# Patient Record
Sex: MALE | HISPANIC OR LATINO | Employment: FULL TIME | URBAN - METROPOLITAN AREA
[De-identification: names, ages, dates, MRNs, and addresses within clinical notes are randomized per-mention and may not be internally consistent; named-entity substitution may affect disease eponyms.]

---

## 2018-04-23 DIAGNOSIS — Z01.812 PRE-OPERATIVE LABORATORY EXAMINATION: ICD-10-CM

## 2018-04-23 LAB
ALBUMIN SERPL BCP-MCNC: 4.5 G/DL (ref 3.2–4.9)
ALBUMIN/GLOB SERPL: 1.5 G/DL
ALP SERPL-CCNC: 76 U/L (ref 30–99)
ALT SERPL-CCNC: 57 U/L (ref 2–50)
ANION GAP SERPL CALC-SCNC: 10 MMOL/L (ref 0–11.9)
AST SERPL-CCNC: 35 U/L (ref 12–45)
BASOPHILS # BLD AUTO: 0.3 % (ref 0–1.8)
BASOPHILS # BLD: 0.03 K/UL (ref 0–0.12)
BILIRUB SERPL-MCNC: 0.8 MG/DL (ref 0.1–1.5)
BUN SERPL-MCNC: 12 MG/DL (ref 8–22)
CALCIUM SERPL-MCNC: 9.7 MG/DL (ref 8.5–10.5)
CHLORIDE SERPL-SCNC: 105 MMOL/L (ref 96–112)
CO2 SERPL-SCNC: 22 MMOL/L (ref 20–33)
CREAT SERPL-MCNC: 0.79 MG/DL (ref 0.5–1.4)
EOSINOPHIL # BLD AUTO: 0.12 K/UL (ref 0–0.51)
EOSINOPHIL NFR BLD: 1.1 % (ref 0–6.9)
ERYTHROCYTE [DISTWIDTH] IN BLOOD BY AUTOMATED COUNT: 44 FL (ref 35.9–50)
GLOBULIN SER CALC-MCNC: 3 G/DL (ref 1.9–3.5)
GLUCOSE SERPL-MCNC: 91 MG/DL (ref 65–99)
HCT VFR BLD AUTO: 50.7 % (ref 42–52)
HGB BLD-MCNC: 17 G/DL (ref 14–18)
IMM GRANULOCYTES # BLD AUTO: 0.04 K/UL (ref 0–0.11)
IMM GRANULOCYTES NFR BLD AUTO: 0.4 % (ref 0–0.9)
INR PPP: 1.05 (ref 0.87–1.13)
LYMPHOCYTES # BLD AUTO: 2.48 K/UL (ref 1–4.8)
LYMPHOCYTES NFR BLD: 23.4 % (ref 22–41)
MCH RBC QN AUTO: 31.2 PG (ref 27–33)
MCHC RBC AUTO-ENTMCNC: 33.5 G/DL (ref 33.7–35.3)
MCV RBC AUTO: 93 FL (ref 81.4–97.8)
MONOCYTES # BLD AUTO: 0.61 K/UL (ref 0–0.85)
MONOCYTES NFR BLD AUTO: 5.7 % (ref 0–13.4)
NEUTROPHILS # BLD AUTO: 7.34 K/UL (ref 1.82–7.42)
NEUTROPHILS NFR BLD: 69.1 % (ref 44–72)
NRBC # BLD AUTO: 0 K/UL
NRBC BLD-RTO: 0 /100 WBC
PLATELET # BLD AUTO: 189 K/UL (ref 164–446)
PMV BLD AUTO: 11.9 FL (ref 9–12.9)
POTASSIUM SERPL-SCNC: 3.9 MMOL/L (ref 3.6–5.5)
PROT SERPL-MCNC: 7.5 G/DL (ref 6–8.2)
PROTHROMBIN TIME: 13.4 SEC (ref 12–14.6)
RBC # BLD AUTO: 5.45 M/UL (ref 4.7–6.1)
SODIUM SERPL-SCNC: 137 MMOL/L (ref 135–145)
WBC # BLD AUTO: 10.6 K/UL (ref 4.8–10.8)

## 2018-04-23 PROCEDURE — 80053 COMPREHEN METABOLIC PANEL: CPT

## 2018-04-23 PROCEDURE — 85610 PROTHROMBIN TIME: CPT

## 2018-04-23 PROCEDURE — 36415 COLL VENOUS BLD VENIPUNCTURE: CPT

## 2018-04-23 PROCEDURE — 85025 COMPLETE CBC W/AUTO DIFF WBC: CPT

## 2018-04-24 ENCOUNTER — HOSPITAL ENCOUNTER (OUTPATIENT)
Facility: MEDICAL CENTER | Age: 38
End: 2018-04-24
Attending: SURGERY | Admitting: SURGERY
Payer: COMMERCIAL

## 2018-04-24 VITALS
DIASTOLIC BLOOD PRESSURE: 79 MMHG | SYSTOLIC BLOOD PRESSURE: 131 MMHG | OXYGEN SATURATION: 96 % | HEART RATE: 70 BPM | HEIGHT: 68 IN | TEMPERATURE: 97.8 F | WEIGHT: 225.53 LBS | BODY MASS INDEX: 34.18 KG/M2 | RESPIRATION RATE: 16 BRPM

## 2018-04-24 DIAGNOSIS — K40.20 NON-RECURRENT BILATERAL INGUINAL HERNIA WITHOUT OBSTRUCTION OR GANGRENE: ICD-10-CM

## 2018-04-24 PROCEDURE — 160029 HCHG SURGERY MINUTES - 1ST 30 MINS LEVEL 4: Performed by: SURGERY

## 2018-04-24 PROCEDURE — A9270 NON-COVERED ITEM OR SERVICE: HCPCS

## 2018-04-24 PROCEDURE — 700111 HCHG RX REV CODE 636 W/ 250 OVERRIDE (IP)

## 2018-04-24 PROCEDURE — 503366 HCHG TROCAR, 12X150 KII FIOS Z THR: Performed by: SURGERY

## 2018-04-24 PROCEDURE — 160046 HCHG PACU - 1ST 60 MINS PHASE II: Performed by: SURGERY

## 2018-04-24 PROCEDURE — C1781 MESH (IMPLANTABLE): HCPCS | Performed by: SURGERY

## 2018-04-24 PROCEDURE — 700101 HCHG RX REV CODE 250

## 2018-04-24 PROCEDURE — 700102 HCHG RX REV CODE 250 W/ 637 OVERRIDE(OP)

## 2018-04-24 PROCEDURE — 502714 HCHG ROBOTIC SURGERY SERVICES: Performed by: SURGERY

## 2018-04-24 PROCEDURE — 160025 RECOVERY II MINUTES (STATS): Performed by: SURGERY

## 2018-04-24 PROCEDURE — 501838 HCHG SUTURE GENERAL: Performed by: SURGERY

## 2018-04-24 PROCEDURE — 160009 HCHG ANES TIME/MIN: Performed by: SURGERY

## 2018-04-24 PROCEDURE — 160048 HCHG OR STATISTICAL LEVEL 1-5: Performed by: SURGERY

## 2018-04-24 PROCEDURE — 500868 HCHG NEEDLE, SURGI(VARES): Performed by: SURGERY

## 2018-04-24 PROCEDURE — 160041 HCHG SURGERY MINUTES - EA ADDL 1 MIN LEVEL 4: Performed by: SURGERY

## 2018-04-24 PROCEDURE — 160036 HCHG PACU - EA ADDL 30 MINS PHASE I: Performed by: SURGERY

## 2018-04-24 PROCEDURE — 160035 HCHG PACU - 1ST 60 MINS PHASE I: Performed by: SURGERY

## 2018-04-24 PROCEDURE — A6402 STERILE GAUZE <= 16 SQ IN: HCPCS | Performed by: SURGERY

## 2018-04-24 PROCEDURE — 160002 HCHG RECOVERY MINUTES (STAT): Performed by: SURGERY

## 2018-04-24 DEVICE — MESH PROGRIP LAPROSCOPIC SELF FIXATING (1/CA): Type: IMPLANTABLE DEVICE | Status: FUNCTIONAL

## 2018-04-24 RX ORDER — LIDOCAINE HYDROCHLORIDE 10 MG/ML
0.5 INJECTION, SOLUTION INFILTRATION; PERINEURAL
Status: DISCONTINUED | OUTPATIENT
Start: 2018-04-24 | End: 2018-04-24 | Stop reason: HOSPADM

## 2018-04-24 RX ORDER — ONDANSETRON 2 MG/ML
4 INJECTION INTRAMUSCULAR; INTRAVENOUS EVERY 4 HOURS PRN
Status: DISCONTINUED | OUTPATIENT
Start: 2018-04-24 | End: 2018-04-24 | Stop reason: HOSPADM

## 2018-04-24 RX ORDER — DEXAMETHASONE SODIUM PHOSPHATE 4 MG/ML
4 INJECTION, SOLUTION INTRA-ARTICULAR; INTRALESIONAL; INTRAMUSCULAR; INTRAVENOUS; SOFT TISSUE
Status: DISCONTINUED | OUTPATIENT
Start: 2018-04-24 | End: 2018-04-24 | Stop reason: HOSPADM

## 2018-04-24 RX ORDER — SODIUM CHLORIDE, SODIUM LACTATE, POTASSIUM CHLORIDE, CALCIUM CHLORIDE 600; 310; 30; 20 MG/100ML; MG/100ML; MG/100ML; MG/100ML
INJECTION, SOLUTION INTRAVENOUS CONTINUOUS
Status: DISCONTINUED | OUTPATIENT
Start: 2018-04-24 | End: 2018-04-24 | Stop reason: HOSPADM

## 2018-04-24 RX ORDER — DIPHENHYDRAMINE HYDROCHLORIDE 50 MG/ML
25 INJECTION INTRAMUSCULAR; INTRAVENOUS EVERY 6 HOURS PRN
Status: DISCONTINUED | OUTPATIENT
Start: 2018-04-24 | End: 2018-04-24 | Stop reason: HOSPADM

## 2018-04-24 RX ORDER — HYDROCODONE BITARTRATE AND ACETAMINOPHEN 5; 325 MG/1; MG/1
1-2 TABLET ORAL EVERY 4 HOURS PRN
Qty: 28 TAB | Refills: 0 | Status: SHIPPED | OUTPATIENT
Start: 2018-04-24 | End: 2018-04-27

## 2018-04-24 RX ORDER — LIDOCAINE HYDROCHLORIDE 10 MG/ML
INJECTION, SOLUTION INFILTRATION; PERINEURAL
Status: COMPLETED
Start: 2018-04-24 | End: 2018-04-24

## 2018-04-24 RX ORDER — BUPIVACAINE HYDROCHLORIDE AND EPINEPHRINE 5; 5 MG/ML; UG/ML
INJECTION, SOLUTION EPIDURAL; INTRACAUDAL; PERINEURAL
Status: DISCONTINUED | OUTPATIENT
Start: 2018-04-24 | End: 2018-04-24 | Stop reason: HOSPADM

## 2018-04-24 RX ORDER — OXYCODONE HCL 5 MG/5 ML
SOLUTION, ORAL ORAL
Status: COMPLETED
Start: 2018-04-24 | End: 2018-04-24

## 2018-04-24 RX ADMIN — LIDOCAINE HYDROCHLORIDE 0.5 ML: 10 INJECTION, SOLUTION INFILTRATION; PERINEURAL at 12:15

## 2018-04-24 RX ADMIN — FENTANYL CITRATE 50 MCG: 50 INJECTION, SOLUTION INTRAMUSCULAR; INTRAVENOUS at 16:19

## 2018-04-24 RX ADMIN — FENTANYL CITRATE 50 MCG: 50 INJECTION, SOLUTION INTRAMUSCULAR; INTRAVENOUS at 16:05

## 2018-04-24 RX ADMIN — SODIUM CHLORIDE, SODIUM LACTATE, POTASSIUM CHLORIDE, CALCIUM CHLORIDE 1000 ML: 600; 310; 30; 20 INJECTION, SOLUTION INTRAVENOUS at 12:15

## 2018-04-24 RX ADMIN — FENTANYL CITRATE 50 MCG: 50 INJECTION, SOLUTION INTRAMUSCULAR; INTRAVENOUS at 16:10

## 2018-04-24 RX ADMIN — FENTANYL CITRATE 50 MCG: 50 INJECTION, SOLUTION INTRAMUSCULAR; INTRAVENOUS at 16:28

## 2018-04-24 RX ADMIN — OXYCODONE HYDROCHLORIDE 10 MG: 5 SOLUTION ORAL at 16:15

## 2018-04-24 ASSESSMENT — PAIN SCALES - GENERAL
PAINLEVEL_OUTOF10: 10
PAINLEVEL_OUTOF10: 1
PAINLEVEL_OUTOF10: 8
PAINLEVEL_OUTOF10: 3

## 2018-04-25 NOTE — OR NURSING
The pt is awake and oriented. Respirations are regular and easy. Pain is controlled, the pt is comfortable. Dressings dry and intact.

## 2018-04-26 NOTE — OP REPORT
DATE OF SERVICE:  04/24/2018    SURGEON:  Marc Ojeda MD    ASSISTANT:  NATHALIE Segura    TYPE OF ANESTHESIA:  General anesthesia.    ANESTHESIOLOGIST:  Gary Sandoval MD    PREOPERATIVE DIAGNOSIS:  Bilateral inguinal hernias.    POSTOPERATIVE DIAGNOSIS:  Bilateral direct inguinal hernias.    PROCEDURE:  Robotic-assisted laparoscopic repair of bilateral inguinal hernias   with polypropylene mesh.    FINDINGS:  The patient is a 37-year-old gentleman who presents with   symptomatic bilateral inguinal hernias, the left side being larger than the   right.  At the time of surgery, he was found to have bilateral direct inguinal   hernias, which were repaired with ProGrip mesh.  A laparoscopic robotic   assisted approach was performed.  Patient tolerated the procedure well with no   apparent complications.    FINDINGS AND PROCEDURE:  The patient was brought to the operating room and   placed on the table in supine position.  General anesthesia was provided by   the anesthesiologist.  The abdomen was shaved, prepped and draped in usual   sterile fashion.  A time-out was called.  The correct patient, correct   diagnosis, correct surgery, and correct location of the surgery were discussed   and agreed upon.  He did receive preoperative IV antibiotics.  A Veress   needle was inserted into the umbilicus as the abdominal wall was elevated.    Carbon dioxide was then used to create a pneumoperitoneum.  Needle was   removed.  Approximately 6 cm above the umbilicus, a 12 mm incision was made   with #11 blade through the skin and dermis.  Through this incision, a 12 mm   [employer] port was then placed into the abdomen under laparoscopic   vision.  A laparoscope was then passed through the port.  Two 8 mm ports were   then placed in the right and left upper abdomen.  These were both placed under   laparoscopic vision.  The patient was then placed in the Trendelenburg   position.  The lower abdomen was then  evaluated with the laparoscope.  Patient   was noted to have moderate sized bilateral direct inguinal hernia.  There was   a considerable fat in the peritoneal space as well as in the preperitoneal   space.  A monopolar scissor was placed into arm #1.  A bipolar grasping clamp   was placed in the arm #2.  The robot was brought into position and docked to   the port.  Dr. Ojeda then performed a laparoscopic repair.  Incision was made   above the defect in the direct space on the left side with the monopolar   scissors.  This incision was carried out from lateral towards the medial   aspect until the medial umbilical ligament was encountered.  The peritoneum   was then carefully dissected away from the inferior epigastric vessels and the   cord structures.  The patient was noted to have a moderate sized direct   inguinal hernia on this side.  This hernia sac was carefully dissected out of   the space and a space was then made in both medial and lateral to the   spermatic cord.  The pubic tubercle was cleared and carefully exposed.  Next,   a similar incision was made in the peritoneum approximately 1-2 cm above the   right direct inguinal hernia defect.  This also was made with the monopolar   scissors, ____ was also similarly carried down to reduce the direct inguinal   hernia defect in the relatively fatty preperitoneal space.  The pubic tubercle   and Dao's ligament were dissected out of the medial aspect and the   abdominal wall was dissected in the lateral aspect.  The peritoneal lining was   carefully dissected away from the cord structure.  This was done until a   large enough area had been dissected in order to accommodate mesh.  A 3x5 inch   piece of ProGrip mesh was then placed into the one of the port and then   unfurled into the left hernia dissection area.  Likewise, a 3x5 inch piece of   ProGrip mesh was placed through the port and unfurled into the right hernia   dissection area.  The meshes were  carefully placed, so that it overlapped the   Dao's ligament medially, the defect in the central area, and then the ____   laterally over the abdominal wall on both sides.  Once the mesh had a nice   flat lie against the floor of the inguinal canal, the peritoneum was closed on   each side with a running #1 Stratafix suture.  The peritoneum now completely   closed over the mesh.  The area was inspected.  There was no evidence of any   bleeding or other complications.  The needles were removed.  The air was   allowed to escape and the ports removed.  Prior to the port removal; however,   an Endoclose device was used to suture the 12 mm port site at the fascia   level, with a #1 Vicryl suture.  All 3 skin incisions were closed using   running 4-0 Monocryl suture in subcuticular fashion.  Steri-Strips and sterile   dressings were applied.  The patient did tolerate the procedure well.  There   were no complications.  Final sponge and needle count were correct.  He was   then transferred back to recovery room for further postoperative care after   extubation.       ____________________________________     MD OG LU / BEN    DD:  04/25/2018 21:03:36  DT:  04/25/2018 22:48:32    D#:  [id number]  Job#:  [id number]    cc: CHRIS SIMONS, _____, _____

## 2019-03-01 ENCOUNTER — HOSPITAL ENCOUNTER (EMERGENCY)
Facility: MEDICAL CENTER | Age: 39
End: 2019-03-01
Attending: EMERGENCY MEDICINE
Payer: COMMERCIAL

## 2019-03-01 ENCOUNTER — APPOINTMENT (OUTPATIENT)
Dept: RADIOLOGY | Facility: MEDICAL CENTER | Age: 39
End: 2019-03-01
Attending: EMERGENCY MEDICINE
Payer: COMMERCIAL

## 2019-03-01 VITALS
SYSTOLIC BLOOD PRESSURE: 132 MMHG | OXYGEN SATURATION: 96 % | HEART RATE: 87 BPM | RESPIRATION RATE: 19 BRPM | BODY MASS INDEX: 36.39 KG/M2 | WEIGHT: 240.08 LBS | HEIGHT: 68 IN | TEMPERATURE: 98.4 F | DIASTOLIC BLOOD PRESSURE: 92 MMHG

## 2019-03-01 DIAGNOSIS — S16.1XXA STRAIN OF NECK MUSCLE, INITIAL ENCOUNTER: ICD-10-CM

## 2019-03-01 PROCEDURE — 700102 HCHG RX REV CODE 250 W/ 637 OVERRIDE(OP): Performed by: EMERGENCY MEDICINE

## 2019-03-01 PROCEDURE — 72040 X-RAY EXAM NECK SPINE 2-3 VW: CPT

## 2019-03-01 PROCEDURE — 99284 EMERGENCY DEPT VISIT MOD MDM: CPT

## 2019-03-01 PROCEDURE — A9270 NON-COVERED ITEM OR SERVICE: HCPCS | Performed by: EMERGENCY MEDICINE

## 2019-03-01 RX ORDER — HYDROCODONE BITARTRATE AND ACETAMINOPHEN 5; 325 MG/1; MG/1
1-2 TABLET ORAL EVERY 6 HOURS PRN
Qty: 20 TAB | Refills: 0 | Status: SHIPPED | OUTPATIENT
Start: 2019-03-01 | End: 2019-03-04

## 2019-03-01 RX ORDER — HYDROCODONE BITARTRATE AND ACETAMINOPHEN 5; 325 MG/1; MG/1
1 TABLET ORAL ONCE
Status: COMPLETED | OUTPATIENT
Start: 2019-03-01 | End: 2019-03-01

## 2019-03-01 RX ORDER — IBUPROFEN 600 MG/1
600 TABLET ORAL ONCE
Status: COMPLETED | OUTPATIENT
Start: 2019-03-01 | End: 2019-03-01

## 2019-03-01 RX ADMIN — IBUPROFEN 600 MG: 600 TABLET ORAL at 18:20

## 2019-03-01 RX ADMIN — HYDROCODONE BITARTRATE AND ACETAMINOPHEN 1 TABLET: 5; 325 TABLET ORAL at 18:20

## 2019-03-01 ASSESSMENT — ENCOUNTER SYMPTOMS
HEADACHES: 1
CHILLS: 0
COUGH: 0
FEVER: 0
NECK PAIN: 1

## 2019-03-01 NOTE — LETTER
"  FORM C-4:  EMPLOYEE’S CLAIM FOR COMPENSATION/ REPORT OF INITIAL TREATMENT  EMPLOYEE’S CLAIM - PROVIDE ALL INFORMATION REQUESTED   First Name  Josiah Last Name  Jos Strong Birthdate             Age  1980 38 y.o. 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 Be specific and answer in detail. Use additional sheet if necessary)  Sore neck during work and it got worse at night and next day    If you believe that you have an occupational disease, when did you first have knowledge of the disability and it relationship to your employment?  NA Witnesses to the Accident  NA     Nature of Injury or Occupational Disease  Workers' Compensation  Part(s) of Body Injured or Affected  Soft Tissue - Neck, N/A, N/A    I certify that the above is true and correct to the best of my knowledge and that I have provided this information in order to obtain the benefits of " [place]  I hereby authorize any physician, chiropractor, surgeon, practitioner, or other person, any hospital, including [facility] or [facility] [place], any medical service organization, any insurance company, or other institution or organization to release to each other, any medical or other information, including benefits paid or payable, pertinent to this injury or disease, except information relative to diagnosis, treatment and/or counseling for AIDS, psychological conditions, alcohol or controlled substances, for which I must give specific authorization.  A Photostat of this authorization shall be as valid as the original.   Date 03/01/2019 [facility]   Employee’s Signature   THIS REPORT MUST BE COMPLETED AND MAILED WITHIN 3 WORKING DAYS OF TREATMENT   Place  [facility], EMERGENCY DEPT  Name of Facility   [facility]   Date  3/1/2019 Diagnosis  (S16.1XXA) Strain of neck muscle, initial encounter Is there evidence the injured employee was under the influence of alcohol and/or another controlled substance at the time of accident?   Hour  8:50 PM Description of Injury or Disease  Strain of neck muscle, initial encounter No   Treatment  Patient was evaluated for neck pain.  Complaining of pain that started today while he was at work.  The patient denies any direct injury.  Have you advised the patient to remain off work five days or more?         No   X-Ray Findings  Negative   If Yes   From Date    To Date      From information given by the employee, together with medical evidence, can you directly connect this injury or occupational disease as job incurred?  Yes  Comments:Patient is not sure if he had an injury at job at his job certainly can contribute to a cervical strain If No, is the employee capable of: Full Duty  Yes  "Modified Duty      Is additional medical care by a physician indicated?  Yes  Comments:If symptoms continue follow-up with occupational health for physical therapy referral If Modified Duty, Specify any Limitations / Restrictions        Do you know of any previous injury or disease contributing to this condition or occupational disease?  No   Date  3/1/2019 Print Doctor’s Name  Jasen Avalos certify the employer’s copy of this form was mailed on:   Address  [address] [place] [id number] [phone] Insurer’s Use Only   [facility]  [id number]    Provider’s Tax ID Number  [id number] Telephone  Dept: [phone]    Doctor’s Signature  e-JASEN Rodrigues M.D. Degree   MD    Original - TREATING PHYSICIAN OR CHIROPRACTOR   Pg 2-Insurer/TPA   Pg 3-Employer   Pg 4-Employee                                                                                                  Form C-4 (rev01/03)     BRIEF DESCRIPTION OF RIGHTS AND BENEFITS  (Pursuant to [place] 616C.[place])  Notice of Injury or Occupational Disease (Incident Report Form C-1): If an injury or occupational disease (OD) arises out of and in the course of employment, you must provide written notice to your employer as soon as practicable, but no later than 7 days after the accident or OD. Your employer shall maintain a sufficient supply of the required forms.  Claim for Compensation (Form C-4): If medical treatment is sought, the form C-4 is available at the place of initial treatment. A completed \"Claim for Compensation\" (Form C-4) must be filed within 90 days after an accident or OD. The treating physician or chiropractor must, within 3 working days after treatment, complete and mail to the employer, the employer's insurer and third-party , the Claim for Compensation.  Medical Treatment: If you require medical treatment for your on-the-job injury or OD, you may be required to select a physician or chiropractor from a list " provided by your workers’ compensation insurer, if it has contracted with an Organization for Managed Care (MCO) or Preferred Provider Organization (PPO) or providers of health care. If your employer has not entered into a contract with an MCO or PPO, you may select a physician or chiropractor from the Panel of Physicians and Chiropractors. Any medical costs related to your industrial injury or OD will be paid by your insurer.  Temporary Total Disability (TTD): If your doctor has certified that you are unable to work for a period of at least 5 consecutive days, or 5 cumulative days in a 20-day period, or places restrictions on you that your employer does not accommodate, you may be entitled to TTD compensation.  Temporary Partial Disability (TPD): If the wage you receive upon reemployment is less than the compensation for TTD to which you are entitled, the insurer may be required to pay you TPD compensation to make up the difference. TPD can only be paid for a maximum of 24 months.  Permanent Partial Disability (PPD): When your medical condition is stable and there is an indication of a PPD as a result of your injury or OD, within 30 days, your insurer must arrange for an evaluation by a rating physician or chiropractor to determine the degree of your PPD. The amount of your PPD award depends on the date of injury, the results of the PPD evaluation and your age and wage.  Permanent Total Disability (PTD): If you are medically certified by a treating physician or chiropractor as permanently and totally disabled and have been granted a PTD status by your insurer, you are entitled to receive monthly benefits not to exceed 66 2/3% of your average monthly wage. The amount of your PTD payments is subject to reduction if you previously received a PPD award.  Vocational Rehabilitation Services: You may be eligible for vocational rehabilitation services if you are unable to return to the job due to a permanent physical  impairment or permanent restrictions as a result of your injury or occupational disease.  Transportation and Per Linda Reimbursement: You may be eligible for travel expenses and per linda associated with medical treatment.  Reopening: You may be able to reopen your claim if your condition worsens after claim closure.  Appeal Process: If you disagree with a written determination issued by the insurer or the insurer does not respond to your request, you may appeal to the Department of Administration, , by following the instructions contained in your determination letter. You must appeal the determination within 70 days from the date of the determination letter at [address], [address], [place], [place] [address], or [address], [address], [place] [address]. If you disagree with the  decision, you may appeal to the Department of Administration, . You must file your appeal within 30 days from the date of the  decision letter at [address], [address], [place], [place] [address], or [address], [facility] [address], [place] [address]. If you disagree with a decision of an , you may file a petition for judicial review with the District Court. You must do so within 30 days of the Appeal Officer’s decision. You may be represented by an  at your own expense or you may contact the [facility] for possible representation.  [place]  for Injured Workers (NAIW): If you disagree with a  decision, you may request that NAIW represent you without charge at an  Hearing. For information regarding denial of benefits, you may contact the [facility] at: [address], [address] [address], [phone]) [phone], or [address], [address], [place] [address], [phone]) [phone]  To File a Complaint with the Division: If you wish to file a complaint with the  of  the Division of Industrial Relations (DIR), please contact the Workers’ Compensation Section, [address], [address], [place], [place] [address], telephone (299) [phone], or [address], [address], [place], [place] [address], telephone ([phone]) [phone].  For assistance with Workers’ Compensation Issues: you may contact the Office of the Governor Consumer Health Assistance, [address] [place] [address], [address], [place], [place] [address], Toll Free 1-299.674.2256, Web site: http://govcha.[facility].nv., E-mail isra@[facility].nv.                                                                                                                                                                               __________________________________________________________________                                    _________________            Employee Name / Signature                                                                                                                            Date                                       D-2 (rev. 10/07)

## 2019-03-01 NOTE — LETTER
"  FORM C-4:  EMPLOYEE’S CLAIM FOR COMPENSATION/ REPORT OF INITIAL TREATMENT  EMPLOYEE’S CLAIM - PROVIDE ALL INFORMATION REQUESTED   First Name  Josiah Last Name  Jos Strong Birthdate             Age  1980 38 y.o. 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 Be specific and answer in detail. Use additional sheet if necessary)  Sore neck during work and it got worse at night and next day    If you believe that you have an occupational disease, when did you first have knowledge of the disability and it relationship to your employment?  NA Witnesses to the Accident  NA     Nature of Injury or Occupational Disease  Workers' Compensation  Part(s) of Body Injured or Affected  Soft Tissue - Neck, N/A, N/A    I certify that the above is true and correct to the best of my knowledge and that I have provided this information in order to obtain the benefits of [place] inclusive or Chapter 617 of [place]RS).  I hereby authorize any physician, chiropractor, surgeon, practitioner, or other person, any hospital, including [facility], any medical service organization, any insurance company, or other institution or organization to release to each other, any medical or other information, including benefits paid or payable, pertinent to this injury or disease, except information relative to diagnosis, treatment and/or counseling for AIDS, psychological conditions, alcohol or controlled substances, for which I must give specific authorization.  A Photostat of this authorization shall be as valid as the original.   Date Place   Employee’s Signature   THIS REPORT MUST BE COMPLETED AND MAILED WITHIN 3 WORKING DAYS OF TREATMENT   Place  [facility], EMERGENCY DEPT  Name of Facility   [facility]   Date  3/1/2019 Diagnosis  No diagnosis found. Is there evidence the injured employee was under the influence of alcohol and/or another controlled substance at the time of accident?   Hour  7:27 PM Description of Injury or Disease       Treatment     Have you advised the patient to remain off work five days or more?             X-Ray Findings      If Yes   From Date    To Date      From information given by the employee, together with medical evidence, can you directly connect this injury or occupational disease as job incurred?    If No, is the employee capable of: Full Duty    Modified Duty      Is additional medical care by a physician indicated?    If Modified Duty, Specify any Limitations / Restrictions        Do you know of any previous injury or disease contributing to this condition or occupational disease?      Date  3/1/2019 Print Doctor’s Name  Jasen Avalos I certify the employer’s copy of this form was mailed on:   Address  [address] [place] [address] [phone] Insurer’s  "Use Only   [facility] Zip  [address]    Provider’s Tax ID Number  [id number] Telephone  Dept: [phone]    Doctor’s Signature    Degree       Original - TREATING PHYSICIAN OR CHIROPRACTOR   Pg 2-Insurer/TPA   Pg 3-Employer   Pg 4-Employee                                                                                                  Form C-4 (rev01/03)     BRIEF DESCRIPTION OF RIGHTS AND BENEFITS  (Pursuant to [place]RS 616C.050)    Notice of Injury or Occupational Disease (Incident Report Form C-1): If an injury or occupational disease (OD) arises out of and in the course of employment, you must provide written notice to your employer as soon as practicable, but no later than 7 days after the accident or OD. Your employer shall maintain a sufficient supply of the required forms.    Claim for Compensation (Form C-4): If medical treatment is sought, the form C-4 is available at the place of initial treatment. A completed \"Claim for Compensation\" (Form C-4) must be filed within 90 days after an accident or OD. The treating physician or chiropractor must, within 3 working days after treatment, complete and mail to the employer, the employer's insurer and third-party , the Claim for Compensation.    Medical Treatment: If you require medical treatment for your on-the-job injury or OD, you may be required to select a physician or chiropractor from a list provided by your workers’ compensation insurer, if it has contracted with an Organization for Managed Care (MCO) or Preferred Provider Organization (PPO) or providers of health care. If your employer has not entered into a contract with an MCO or PPO, you may select a physician or chiropractor from the Panel of Physicians and Chiropractors. Any medical costs related to your industrial injury or OD will be paid by your insurer.    Temporary Total Disability (TTD): If your doctor has certified that you are unable to work for a period of at least 5 " consecutive days, or 5 cumulative days in a 20-day period, or places restrictions on you that your employer does not accommodate, you may be entitled to TTD compensation.    Temporary Partial Disability (TPD): If the wage you receive upon reemployment is less than the compensation for TTD to which you are entitled, the insurer may be required to pay you TPD compensation to make up the difference. TPD can only be paid for a maximum of 24 months.    Permanent Partial Disability (PPD): When your medical condition is stable and there is an indication of a PPD as a result of your injury or OD, within 30 days, your insurer must arrange for an evaluation by a rating physician or chiropractor to determine the degree of your PPD. The amount of your PPD award depends on the date of injury, the results of the PPD evaluation and your age and wage.    Permanent Total Disability (PTD): If you are medically certified by a treating physician or chiropractor as permanently and totally disabled and have been granted a PTD status by your insurer, you are entitled to receive monthly benefits not to exceed 66 2/3% of your average monthly wage. The amount of your PTD payments is subject to reduction if you previously received a PPD award.    Vocational Rehabilitation Services: You may be eligible for vocational rehabilitation services if you are unable to return to the job due to a permanent physical impairment or permanent restrictions as a result of your injury or occupational disease.    Transportation and Per Linda Reimbursement: You may be eligible for travel expenses and per linda associated with medical treatment.  Reopening: You may be able to reopen your claim if your condition worsens after claim closure.    Appeal Process: If you disagree with a written determination issued by the insurer or the insurer does not respond to your request, you may appeal to the Department of Administration, , by following the  instructions contained in your determination letter. You must appeal the determination within 70 days from the date of the determination letter at [address], [address], [place], [place] [address], or [address], [address], [place], [place] [address]. If you disagree with the  decision, you may appeal to the Department of Administration, . You must file your appeal within 30 days from the date of the  decision letter at [address], [address], [place], [place] [address], or [address], [address], [place], [place] [address]. If you disagree with a decision of an , you may file a petition for judicial review with the District Court. You must do so within 30 days of the Appeal Officer’s decision. You may be represented by an  at your own expense or you may contact the [facility] for possible representation.    [place]  for Injured Workers (NAIW): If you disagree with a  decision, you may request that NAIW represent you without charge at an  Hearing. For information regarding denial of benefits, you may contact the [facility] at: [address], [address], [place] [address], [phone], or [address], [address], [place] [address], [phone]    To File a Complaint with the Division: If you wish to file a complaint with the  of the Division of Industrial Relations (DIR), please contact the Workers’ Compensation Section, [address], [address], [place], [place] [address], telephone [phone]) [phone], or [address] [facility] [facility] [address], [place] [address], telephone [phone].    For assistance with Workers’ Compensation Issues: you may contact the Office of the Governor Consumer Health Assistance, [address] [place] [address], [address], [place], [place] [address], Toll Free 1-529.139.9152, Web site: http://govcha.[facility].nv.,  E-mail isra@[facility].state.nv.us                                                                                                                                                                               __________________________________________________________________                                    _________________            Employee Name / Signature                                                                                                                            Date                                       D-2 (rev. 10/07)

## 2019-03-01 NOTE — LETTER
"  FORM C-4:  EMPLOYEE’S CLAIM FOR COMPENSATION/ REPORT OF INITIAL TREATMENT  EMPLOYEE’S CLAIM - PROVIDE ALL INFORMATION REQUESTED   First Name  Josiah Last Name  Jos Strong Birthdate             Age  1980 38 y.o. 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 Be specific and answer in detail. Use additional sheet if necessary)  Sore neck during work and it got worse at night and next day    If you believe that you have an occupational disease, when did you first have knowledge of the disability and it relationship to your employment?  NA Witnesses to the Accident  NA     Nature of Injury or Occupational Disease  Workers' Compensation  Part(s) of Body Injured or Affected  Soft Tissue - Neck, N/A, N/A    I certify that the above is true and correct to the best of my knowledge and that I have provided this information in order to obtain the benefits of " [place] ([place] 616A to 616D, inclusive or Chapter 617 of [place]RS).  I hereby authorize any physician, chiropractor, surgeon, practitioner, or other person, any hospital, including [facility], any medical service organization, any insurance company, or other institution or organization to release to each other, any medical or other information, including benefits paid or payable, pertinent to this injury or disease, except information relative to diagnosis, treatment and/or counseling for AIDS, psychological conditions, alcohol or controlled substances, for which I must give specific authorization.  A Photostat of this authorization shall be as valid as the original.   Date  03/01/2019 [facility]   Employee’s Signature   THIS REPORT MUST BE COMPLETED AND MAILED WITHIN 3 WORKING DAYS OF TREATMENT   Place  [facility], EMERGENCY DEPT  Name of Facility   [facility]   Date  3/1/2019 Diagnosis  (S16.1XXA) Strain of neck muscle, initial encounter Is there evidence the injured employee was under the influence of alcohol and/or another controlled substance at the time of accident?   Hour  9:35 PM Description of Injury or Disease  Strain of neck muscle, initial encounter No   Treatment  Patient was evaluated for neck pain.  Complaining of pain that started today while he was at work.  The patient denies any direct injury.  Have you advised the patient to remain off work five days or more?         No   X-Ray Findings  Negative   If Yes   From Date    To Date      From information given by the employee, together with medical evidence, can you directly connect this injury or occupational disease as job incurred?  Yes  Comments:Patient is not sure if he had an injury at job at his job certainly can contribute to a cervical strain If No, is the employee capable of: Full Duty  Yes  "Modified Duty      Is additional medical care by a physician indicated?  Yes  Comments:If symptoms continue follow-up with occupational health for physical therapy referral If Modified Duty, Specify any Limitations / Restrictions        Do you know of any previous injury or disease contributing to this condition or occupational disease?  No   Date  3/1/2019 Print Doctor’s Name  Jasen Avalos certify the employer’s copy of this form was mailed on:   Address  [address] [place] [address] [phone] Insurer’s Use Only   [facility]  [address]    Provider’s Tax ID Number  [id number] Telephone  Dept: [phone]    Doctor’s Signature  e-JASEN Rodrigues M.D. Degree   MD    Original - TREATING PHYSICIAN OR CHIROPRACTOR   Pg 2-Insurer/TPA   Pg 3-Employer   Pg 4-Employee                                                                                                  Form C-4 (rev01/03)     BRIEF DESCRIPTION OF RIGHTS AND BENEFITS  ([place]ursuant to [place]RS 616C.050)    Notice of Injury or Occupational Disease (Incident Report Form C-1): If an injury or occupational disease (OD) arises out of and in the course of employment, you must provide written notice to your employer as soon as practicable, but no later than 7 days after the accident or OD. Your employer shall maintain a sufficient supply of the required forms.  Claim for Compensation (Form C-4): If medical treatment is sought, the form C-4 is available at the place of initial treatment. A completed \"Claim for Compensation\" (Form C-4) must be filed within 90 days after an accident or OD. The treating physician or chiropractor must, within 3 working days after treatment, complete and mail to the employer, the employer's insurer and third-party , the Claim for Compensation.  Medical Treatment: If you require medical treatment for your on-the-job injury or OD, you may be required to select a physician or chiropractor from a " list provided by your workers’ compensation insurer, if it has contracted with an Organization for Managed Care (MCO) or Preferred Provider Organization (PPO) or providers of health care. If your employer has not entered into a contract with an MCO or PPO, you may select a physician or chiropractor from the Panel of Physicians and Chiropractors. Any medical costs related to your industrial injury or OD will be paid by your insurer.  Temporary Total Disability (TTD): If your doctor has certified that you are unable to work for a period of at least 5 consecutive days, or 5 cumulative days in a 20-day period, or places restrictions on you that your employer does not accommodate, you may be entitled to TTD compensation.  Temporary Partial Disability (TPD): If the wage you receive upon reemployment is less than the compensation for TTD to which you are entitled, the insurer may be required to pay you TPD compensation to make up the difference. TPD can only be paid for a maximum of 24 months.  Permanent Partial Disability (PPD): When your medical condition is stable and there is an indication of a PPD as a result of your injury or OD, within 30 days, your insurer must arrange for an evaluation by a rating physician or chiropractor to determine the degree of your PPD. The amount of your PPD award depends on the date of injury, the results of the PPD evaluation and your age and wage.  Permanent Total Disability (PTD): If you are medically certified by a treating physician or chiropractor as permanently and totally disabled and have been granted a PTD status by your insurer, you are entitled to receive monthly benefits not to exceed 66 2/3% of your average monthly wage. The amount of your PTD payments is subject to reduction if you previously received a PPD award.  Vocational Rehabilitation Services: You may be eligible for vocational rehabilitation services if you are unable to return to the job due to a permanent physical  impairment or permanent restrictions as a result of your injury or occupational disease.  Transportation and Per Linda Reimbursement: You may be eligible for travel expenses and per linda associated with medical treatment.  Reopening: You may be able to reopen your claim if your condition worsens after claim closure.  Appeal Process: If you disagree with a written determination issued by the insurer or the insurer does not respond to your request, you may appeal to the Department of Administration, , by following the instructions contained in your determination letter. You must appeal the determination within 70 days from the date of the determination letter at [address], [address], [place], [place] [address], or [address], [address], [place] [address]. If you disagree with the  decision, you may appeal to the Department of Administration, . You must file your appeal within 30 days from the date of the  decision letter at [address], [address], [place], [place] [address], or [address], [facility] [address], [place] [address]. If you disagree with a decision of an , you may file a petition for judicial review with the District Court. You must do so within 30 days of the Appeal Officer’s decision. You may be represented by an  at your own expense or you may contact the [facility] for possible representation.  [place]  for Injured Workers (NAIW): If you disagree with a  decision, you may request that NAIW represent you without charge at an  Hearing. For information regarding denial of benefits, you may contact the [facility] at: [address], [address], [place] [address], [phone], or [address], [address], [place] [address], [phone]  To File a Complaint with the Division: If you wish to file a complaint with the  of  the Division of Industrial Relations (DIR), please contact the Workers’ Compensation Section, [address], [address], [place], [place] [address], telephone (622) [phone], or [address], [address], [place], [place] [address], telephone [phone].  For assistance with Workers’ Compensation Issues: you may contact the Office of the Governor Consumer Health Assistance, [address] [place] [address], [address], [place], [place] [address], Toll Free 1-881.343.7775, Web site: http://govcha.[facility].nv., E-mail isra@[facility].[email or web address].                                                                                                                                                                               __________________________________________________________________                                    _________________            Employee Name / Signature                                                                                                                            Date                                       D-2 (rev. 10/07)

## 2019-03-02 NOTE — ED TRIAGE NOTES
".  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 Pt has taken advil at home w/ no relief of pain. Pt rates pain at an 8/10. Pt denies N/V, denies fevers.   Patient given mask and placed in lobby; educated to notify staff of new or worsening symptoms.     "

## 2019-03-02 NOTE — ED PROVIDER NOTES
"ED Provider Note    Scribed for Jasen Avalos M.D. by Barbi Phillips. 3/1/2019, 5:24 PM.    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 male who presents to the Emergency Department with neck pain which began yesterday. Patient states his pain radiates from his neck up to his head causing an associated headache. His pain does not radiate to his arms. Patient's pain is exacerbated with movement. He was recently installing floors at work but denies direct injury to the neck. No fevers, chills, and cough.     REVIEW OF SYSTEMS  Review of Systems   Constitutional: Negative for chills and fever.   Respiratory: Negative for cough.    Musculoskeletal: Positive for neck pain.        No arm pain.    Neurological: Positive for headaches.     PAST MEDICAL HISTORY   has a past medical history of Anesthesia; Sleep apnea; and Snoring.    SURGICAL HISTORY   has a past surgical history that includes other (03/06/2018) and inguinal hernia repair robotic (Bilateral, 4/24/2018).    SOCIAL HISTORY  Social History   Substance Use Topics   • Smoking status: Current Every Day Smoker     Packs/day: 1.00     Years: 20.00     Types: Cigarettes   • Smokeless tobacco: Never Used   • Alcohol use Yes      Comment: 10 per week      History   Drug Use No       FAMILY HISTORY  No pertinent history     CURRENT MEDICATIONS  Current medications can be reviewed in the nurse's note.     ALLERGIES  No Known Allergies    PHYSICAL EXAM  VITAL SIGNS: /92   Pulse 80   Temp 36.9 °C (98.4 °F) (Temporal)   " "Resp 20   Ht 1.727 m (5' 8\")   Wt 108.9 kg (240 lb 1.3 oz)   SpO2 97%   BMI 36.50 kg/m²     Constitutional: Well developed, Well nourished, No acute distress, Non-toxic appearance.   HENT: Normocephalic, Atraumatic, Bilateral external ears normal, oropharynx moist, No oral exudates, Nose normal.   Eyes:conjunctiva is normal, there are no signs of exudate.   Neck:  Tenderness to bilateral sternocleidomastoids and bilateral paraspinal muscles, limited range of motion secondary to pain.   Lymphatic: No lymphadenopathy noted.   Cardiovascular: Regular rate and rhythm without murmurs gallops or rubs.   Thorax & Lungs: No respiratory distress. Breathing comfortably. Lungs are clear to auscultation bilaterally, there are no wheezes no rales. Chest wall is nontender.  Abdomen: Soft, nontender, nondistended. Bowel sounds are present.   Skin: Warm, Dry, No erythema,   Back: No tenderness, No CVA tenderness.  Musculoskeletal: No major deformities noted. Intact distal pulses, no clubbing, no cyanosis, no edema,   Neurologic: Alert & oriented x 3, Moving all extremities. No gross abnormalities.    Psychiatric: Affect normal, Judgment normal, Mood normal.     RADIOLOGY  DX-CERVICAL SPINE-2 OR 3 VIEWS   Final Result      No acute compression identified.   Upper cervical prevertebral soft tissue thickening similar to previous findings. If symptoms remain of concern, would recommend consideration of CT for further evaluation.        The radiologist's interpretation of all radiological studies have been reviewed by me.    COURSE & MEDICAL DECISION MAKING  Pertinent Labs & Imaging studies reviewed. (See chart for details)    5:24 PM - Patient seen and examined at bedside. Patient will be treated with Norco 1 tablet, Motrin 600 mg. Ordered DX cervical spine to evaluate his symptoms. The differential diagnoses include but are not limited to: likely neck spasm, torticollis.     7:25 PM- Reviewed the patient's imaging results which " are shown above.     8:15 PM- Patient rechecked at bedside and is feeling improved after receiving medications. The patient was updated on diagnostic test results as seen above. The patient will be discharged, status improved, with instructions regarding supportive care and with a prescription for Norco. Instructions were given for follow-up. Discussed indications for seeking immediate medical attention. Patient was given the opportunity for questions. The patient understands and agrees.     Decision Making:  Patient was given a Norco for pain control.  As well as ibuprofen.  Upon reevaluation he is moving his neck much better I do not feel that this is infectious in nature.  From the standpoint of the x-ray findings it is unchanged from prior x-ray in 2015 of which she subsequently had a CT scan as well.  This showed no significant abnormalities.  This point I do feel its torticollis/neck spasms.  I will start the patient narcotic pain medication as well as recommend for the patient to follow primary care person for recheck and further outpatient treatment and care.    I reviewed prescription monitoring program for patient's narcotic use before prescribing a scheduled drug.The patient will not drink alcohol nor drive with prescribed medications. The patient will return for new or worsening symptoms and is stable at the time of discharge.    It was noticed that the patient's blood pressure was greater than 120/80 on today's visit. At this point, most likely related to reactive hypertension, secondary to the emergency visit itself. I have recommend the patient followup with his primary care physician for recheck of his blood pressure.       I reviewed prescription monitoring program for patient's narcotic use before prescribing a scheduled drug.The patient will not drink alcohol nor drive with prescribed medications      In prescribing controlled substances to this patient, I certify that I have obtained and reviewed  the medical history this patient I have also made a good jett effort to obtain applicable records from other providers who have treated the patient and records did not demonstrate any increased risk of substance abuse that would prevent me from prescribing controlled substances.     I have conducted a physical exam and documented it. I have reviewed Mr. Jos Strong’s prescription history as maintained by the [place] Prescription Monitoring Program.     I have assessed the patient’s risk for abuse, dependency, and addiction using the validated Opioid Risk Tool available at https://www.mdcalc.com/kzstaw-ueni-amkg-ort-narcotic-abuse.     Given the above, I believe the benefits of controlled substance therapy outweigh the risks. The reasons for prescribing controlled substances include in my professional opinion, controlled substances are a reasonable choice for this patient. Accordingly, I have discussed the risk and benefits, treatment plan, and alternative therapies with the patient. The patient has been consented for the medication and understands the risks.    DISPOSITION:  Patient will be discharged home in stable condition.    FOLLOW UP:  BONNIE Kidd  [address]  [facility] [address]  [phone]    Schedule an appointment as soon as possible for a visit in 1 week  For re-check, Return if any symptoms worsen    OUTPATIENT MEDICATIONS:  New Prescriptions    HYDROCODONE-ACETAMINOPHEN (NORCO) 5-325 MG TAB PER TABLET    Take 1-2 Tabs by mouth every 6 hours as needed for up to 3 days.     FINAL IMPRESSION  1. Strain of neck muscle, initial encounter        Barbi WEISS (Bridger), am scribing for, and in the presence of, Jasen Avalos M.D..    Electronically signed by: Barbi Phillips (Bridger), 3/1/2019    IJasen M.D. personally performed the services described in this documentation, as scribed by Barbi Phillips in my presence, and it is both accurate and complete. E.      The  note accurately reflects work and decisions made by me.  Jasen Avalos  3/1/2019  9:36 PM

## 2022-03-24 NOTE — OR SURGEON
Immediate Post OP Note    PreOp Diagnosis: Left greater than right inguinal hernias    PostOp Diagnosis: same (both direct)    Procedure(s):  BILATERAL INGUINAL HERNIA REPAIR ROBOTIC - Wound Class: Clean    Surgeon(s):  Marc Ojeda M.D.    Anesthesiologist/Type of Anesthesia:  Anesthesiologist: Gary Sandoval M.D./General    Surgical Staff:  Assistant: BONNIE Dubon  Circulator: Shahida Palumbo R.N.  Relief Circulator: Katherine Perez R.N.  Relief Scrub: Susan Gonzalez  Scrub Person: Rohith Fajardo    Specimens removed if any:  * No specimens in log *    Estimated Blood Loss: minimal    Findings: bilateral moderate direct inguinal hernias, a lot of fat in inguinal space    Complications: no apparent complications        4/24/2018 3:57 PM Marc Ojeda M.D.      
no

## 2022-09-20 NOTE — DISCHARGE INSTRUCTIONS
ACTIVITY: Rest and take it easy for the first 24 hours.  A responsible adult is recommended to remain with you during that time.  It is normal to feel sleepy.  We encourage you to not do anything that requires balance, judgment or coordination.    MILD FLU-LIKE SYMPTOMS ARE NORMAL. YOU MAY EXPERIENCE GENERALIZED MUSCLE ACHES, THROAT IRRITATION, HEADACHE AND/OR SOME NAUSEA.    FOR 24 HOURS DO NOT:  Drive, operate machinery or run household appliances.  Drink beer or alcoholic beverages.   Make important decisions or sign legal documents.    SPECIAL INSTRUCTIONS:     Ice packs intermittently to both groins for 48 hours   Remove plastic and gauze in 3 days   Leave underlying steristips on until they fall off   Patient may shower on Post OP Day #2   Avoid any heavy lifting more than 15 pounds for 4 weeks    DIET: To avoid nausea, slowly advance diet as tolerated, avoiding spicy or greasy foods for the first day.  Add more substantial food to your diet according to your physician's instructions.  Babies can be fed formula or breast milk as soon as they are hungry.  INCREASE FLUIDS AND FIBER TO AVOID CONSTIPATION.    SURGICAL DRESSING/BATHING: See above. Call MD with any questions.     FOLLOW-UP APPOINTMENT:  A follow-up appointment should be arranged with your doctor in 1-2 weeks; call to schedule.    You should CALL YOUR PHYSICIAN if you develop:  Fever greater than 101 degrees F.  Pain not relieved by medication, or persistent nausea or vomiting.  Excessive bleeding (blood soaking through dressing) or unexpected drainage from the wound.  Extreme redness or swelling around the incision site, drainage of pus or foul smelling drainage.  Inability to urinate or empty your bladder within 8 hours.  Problems with breathing or chest pain.    You should call 911 if you develop problems with breathing or chest pain.  If you are unable to contact your doctor or surgical center, you should go to the nearest emergency room or  Detail Level: Detailed Additional Notes: COVID-19 vaccine received x3 urgent care center.  Physician's telephone #: [phone]    If any questions arise, call your doctor.  If your doctor is not available, please feel free to call the Surgical Center at [phone].  The Center is open Monday through Friday from 7AM to 7PM.  You can also call the HEALTH HOTLINE open 24 hours/day, 7 days/week and speak to a nurse at [phone], or toll free at [phone].    A registered nurse may call you a few days after your surgery to see how you are doing after your procedure.    MEDICATIONS: Resume taking daily medication.  Take prescribed pain medication with food.  If no medication is prescribed, you may take non-aspirin pain medication if needed.  PAIN MEDICATION CAN BE VERY CONSTIPATING.  Take a stool softener or laxative such as senokot, pericolace, or milk of magnesia if needed.    Prescription given for Norco.  Last pain medication given at *4:15 (Can have next pain medication at *8:15**).     If your physician has prescribed pain medication that includes Acetaminophen (Tylenol), do not take additional Acetaminophen (Tylenol) while taking the prescribed medication.    Depression / Suicide Risk    As you are discharged from this [facility] facility, it is important to learn how to keep safe from harming yourself.    Recognize the warning signs:  · Abrupt changes in personality, positive or negative- including increase in energy   · Giving away possessions  · Change in eating patterns- significant weight changes-  positive or negative  · Change in sleeping patterns- unable to sleep or sleeping all the time   · Unwillingness or inability to communicate  · Depression  · Unusual sadness, discouragement and loneliness  · Talk of wanting to die  · Neglect of personal appearance   · Rebelliousness- reckless behavior  · Withdrawal from people/activities they love  · Confusion- inability to concentrate     If you or a loved one observes any of these behaviors or has concerns about  Quality 431: Preventive Care And Screening: Unhealthy Alcohol Use - Screening: Patient not identified as an unhealthy alcohol user when screened for unhealthy alcohol use using a systematic screening method self-harm, here's what you can do:  · Talk about it- your feelings and reasons for harming yourself  · Remove any means that you might use to hurt yourself (examples: pills, rope, extension cords, firearm)  · Get professional help from the community (Mental Health, Substance Abuse, psychological counseling)  · Do not be alone:Call your Safe Contact- someone whom you trust who will be there for you.  · Call your local CRISIS HOTLINE 686-7857 or 670-235-7272  · Call your local Children's Mobile Crisis Response Team [place] (906) 689-8222 or www.[employer]  · Call the toll free National Suicide Prevention Hotlines   · National Suicide Prevention Lifeline 400-380-TVXI (7445)  · National Hope Line Network 800-SUICIDE (901-9593)   Quality 110: Preventive Care And Screening: Influenza Immunization: Influenza Immunization previously received during influenza season Quality 226: Preventive Care And Screening: Tobacco Use: Screening And Cessation Intervention: Patient screened for tobacco use and is an ex/non-smoker

## 2023-09-27 ENCOUNTER — HOSPITAL ENCOUNTER (OUTPATIENT)
Dept: RADIOLOGY | Facility: MEDICAL CENTER | Age: 43
End: 2023-09-27
Attending: NURSE PRACTITIONER
Payer: COMMERCIAL

## 2023-09-27 ENCOUNTER — OCCUPATIONAL MEDICINE (OUTPATIENT)
Dept: URGENT CARE | Facility: PHYSICIAN GROUP | Age: 43
End: 2023-09-27
Payer: COMMERCIAL

## 2023-09-27 VITALS
HEIGHT: 68 IN | SYSTOLIC BLOOD PRESSURE: 138 MMHG | BODY MASS INDEX: 37.42 KG/M2 | DIASTOLIC BLOOD PRESSURE: 88 MMHG | WEIGHT: 246.91 LBS | TEMPERATURE: 99 F | RESPIRATION RATE: 16 BRPM | HEART RATE: 101 BPM | OXYGEN SATURATION: 96 %

## 2023-09-27 DIAGNOSIS — S67.22XA CRUSHING INJURY OF LEFT HAND, INITIAL ENCOUNTER: ICD-10-CM

## 2023-09-27 PROCEDURE — 99213 OFFICE O/P EST LOW 20 MIN: CPT | Performed by: NURSE PRACTITIONER

## 2023-09-27 PROCEDURE — 3075F SYST BP GE 130 - 139MM HG: CPT | Performed by: NURSE PRACTITIONER

## 2023-09-27 PROCEDURE — 3079F DIAST BP 80-89 MM HG: CPT | Performed by: NURSE PRACTITIONER

## 2023-09-27 PROCEDURE — 73130 X-RAY EXAM OF HAND: CPT | Mod: LT

## 2023-09-27 NOTE — LETTER
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    Medical Information   Related to Industrial Injury? Yes    Subjective Complaints:  DOI:  9/27/2023  ARIAS:  Crush injury - fingers slammed by swinging door of large trash bins  Patient states that he was placing trash into the large trash bins with the swinging sol.  He reports that the gate inadvertently swung closed and smashed his left index and middle fingers.  He noted immediate onset of pain and swelling.  He did apply ice, but he has noted increased swelling and pain to the fingers since the injury.  He states that the fingers are intermittently \"falling asleep\" and then they return to normal sensation. He cannot bend them at all due to pain.     Objective Findings:   Physical Exam  Vitals reviewed.   Constitutional:       General: He is not in acute distress.     Appearance: Normal appearance. He is not ill-appearing.   HENT:      Head: Normocephalic and atraumatic.      Nose: Nose normal.      Mouth/Throat:      Mouth: Mucous membranes are moist.      Pharynx: Oropharynx is clear.   Eyes:      Extraocular Movements: Extraocular movements intact.      Conjunctiva/sclera: Conjunctivae normal.      Pupils: Pupils are equal, round, and reactive to light.   Cardiovascular:      Rate and Rhythm: Normal rate and regular rhythm.      Pulses: Normal pulses.      Heart sounds: Normal heart sounds.   Pulmonary:      Effort: " Pulmonary effort is normal.      Breath sounds: Normal breath sounds.   Abdominal:      General: Abdomen is flat. Bowel sounds are normal.      Palpations: Abdomen is soft.   Musculoskeletal:      Left hand: Swelling, tenderness and bony tenderness present. No deformity or lacerations. Decreased range of motion.      Cervical back: Normal range of motion and neck supple.      Comments: There is swelling to the left index and left middle fingers. There is a small abrasion to the left middle finger.  There is pain to palpation over the entire index and middle fingers.  Patient is unable to move the fingers at all due to pain.  Capillary refill is less than two seconds.     Skin:     General: Skin is warm and dry.      Capillary Refill: Capillary refill takes less than 2 seconds.   Neurological:      General: No focal deficit present.      Mental Status: He is alert and oriented to person, place, and time.   Psychiatric:         Mood and Affect: Mood normal.         Behavior: Behavior normal.       RADIOLOGY RESULTS  DX-HAND 3+ LEFT    Result Date: 2023 6:27 PM HISTORY/REASON FOR EXAM:  Pain/Deformity Following Trauma. Hand pain and swelling TECHNIQUE/EXAM DESCRIPTION AND NUMBER OF VIEWS:  3 views of the LEFT hand. COMPARISON: None FINDINGS: There is no fracture or dislocation. The visualized osseous structures are in anatomic alignment. The joint spaces are preserved. Bone mineralization is age-appropriate..     No acute osseous abnormality.             Pre-Existing Condition(s):     Assessment:   Initial Visit    Status: Additional Care Required  Permanent Disability:No    Plan:      Diagnostics: X-ray    Comments:       Disability Information   Status: Released to Restricted Duty    From:  2023  Through: 10/1/2023 Restrictions are: Temporary   Physical Restrictions   Sitting:    Standing:    Stooping:    Bending:      Squatting:    Walking:    Climbing:    Pushin hrs/day   Pullin  hrs/day Other:    Reaching Above Shoulder (L): 0 hrs/day Reaching Above Shoulder (R):       Reaching Below Shoulder (L):  0 hrs/day Reaching Below Shoulder (R):      Not to exceed Weight Limits   Carrying(hrs): 0 Weight Limit(lb): < or = to 10 pounds Lifting(hrs): 0 Weight  Limit(lb): < or = to 10 pounds   Comments: 1.  Crush injury, left index and middle finger  Restrictions per D39  No use of left hand until recheck on 10/1/2023  Finger splints as needed for comfort  RICE therapy, gentle ROM as tolerated  Return sooner if worse    Repetitive Actions   Hands: i.e. Fine Manipulations from Graspin hrs/day   Feet: i.e. Operating Foot Controls:     Driving / Operate Machinery:     Health Care Provider’s Original or Electronic Signature  JENNIFER Gao. Health Care Provider’s Original or Electronic Signature    Fabian Nelson DO MPH     Clinic Name / Location: [address] [address] Clinic Phone Number: Dept: [phone]   Appointment Time: 5:50 Pm Visit Start Time: 6:11 PM   Check-In Time:  6:08 Pm Visit Discharge Time:  6:53 PM    Original-Treating Physician or Chiropractor    Page 2-Insurer/TPA    Page 3-Employer    Page 4-Employee

## 2023-09-27 NOTE — LETTER
"EMPLOYEE’S CLAIM FOR COMPENSATION/ REPORT OF INITIAL TREATMENT  FORM C-4  PLEASE TYPE OR PRINT    EMPLOYEE’S CLAIM - PROVIDE ALL INFORMATION REQUESTED   First Name  Josiah Last Name  Jos Strong Birthdate                    1980                Sex  male Claim Number (Insurer’s Use Only)   Home Address  [address] Age  42 y.o. Height  1.727 m (5' 8\") Weight  112 kg (246 lb 14.6 oz) [facility]     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    How did this injury or occupational disease occur? (Be specific and answer in detail. Use additional sheet if necessary)  dumster door smashed my fingers   If you believe that you have an occupational disease, when did you first have knowledge of the disability and its relationship to your employment?  n/a Witnesses to the Accident (if applicable)  none      Nature of Injury or Occupational Disease  Workers' Compensation  Part(s) of Body Injured or Affected  Finger (L), Finger (L), N/A    I CERTIFY THAT THE ABOVE IS TRUE AND CORRECT TO T HE BEST OF MY KNOWLEDGE AND THAT I HAVE PROVIDED THIS INFORMATION IN ORDER TO OBTAIN THE BENEFITS OF [place] [employer] " INSURANCE AND OCCUPATIONAL DISEASES [place], OR [place] 617 [place]).  I HEREBY AUTHORIZE ANY PHYSICIAN, CHIROPRACTOR, SURGEON, PRACTITIONER OR ANY OTHER PERSON, ANY HOSPITAL, INCLUDING [facility] OR [facility], ANY  MEDICAL SERVICE ORGANIZATION, ANY INSURANCE COMPANY, OR OTHER INSTITUTION OR ORGANIZATION TO RELEASE TO EACH OTHER, ANY MEDICAL OR OTHER INFORMATION, INCLUDING BENEFITS PAID OR PAYABLE, PERTINENT TO THIS INJURY OR DISEASE, EXCEPT INFORMATION RELATIVE TO DIAGNOSIS, TREATMENT AND/OR COUNSELING FOR AIDS, PSYCHOLOGICAL CONDITIONS, ALCOHOL OR CONTROLLED SUBSTANCES, FOR WHICH I MUST GIVE SPECIFIC AUTHORIZATION.  A PHOTOSTAT OF THIS AUTHORIZATION SHALL BE VALID AS THE ORIGINAL.       Date   Place Employee’s Original or  *Electronic Signature   THIS REPORT MUST BE COMPLETED AND MAILED WITHIN 3 WORKING DAYS OF TREATMENT   Place  [facility] [place]  Name of Facility  [facility]   Date  9/27/2023 Diagnosis and Description of Injury or Occupational Disease  (S67.22XA) Crushing injury of left hand, initial encounter Is there evidence that the injured employee was under the influence of alcohol and/or another controlled substance at the time of accident?  ? No ? Yes (if yes, please explain)   Hour  6:11 PM   The encounter diagnosis was Crushing injury of left hand, initial encounter. No   Treatment  1.  Crush injury, left index and middle finger  Restrictions per D39  No use of left hand until recheck on 10/1/2023  Finger splints as needed for comfort  RICE therapy, gentle ROM as tolerated  Return sooner if worse    Have you advised the patient to remain off work five days or     more?    X-Ray Findings  Negative   ? Yes Indicate dates:   From   To      From information given by the employee, together with medical evidence, can        you directly connect this injury or occupational disease as job incurred?  Yes ? No If no, is the injured employee capable of:   address deficits as defined above and maximize level of functional independence with ADLs and functional mobility  Occupational Performance areas to address include: bathing/ shower, dressing, toilet hygiene, transfer to all surfaces, functional ambulation, functional mobility and IADLs: safety procedures  From OT standpoint, recommendation at time of d/c would be Home with family support, [address] full duty  No ? modified duty  Yes   Is additional medical care by a physician indicated?  Yes If modified duty, specify any limitations / restrictions  Per D39   Do you know of any previous injury or disease contributing to this condition or occupational disease?  ? Yes ? No (Explain if yes)                          No   Date  9/27/2023 Print Health Care Provider's  Name  RAMIRO Gao I certify that the employer’s copy of  this form was delivered to the employer on:   Address  [address] Insurer’s Use Only     [facility] Zip  [address]    Provider’s Tax ID Number  [id number] Telephone  Dept: [phone]             Health Care Provider’s Original or Electronic Signature  e-JOIE Fry Degree (MD,DO, DC,PASergioC,APRN)  APRN      * Complete and attach Release of Information (Form C-4A) when injured employee signs C-4 Form electronically  ORIGINAL - TREATING HEALTHCARE PROVIDER PAGE 2 - INSURER/TPA PAGE 3 - EMPLOYER PAGE 4 - EMPLOYEE             Form C-4 (rev.08/21)           BRIEF DESCRIPTION OF RIGHTS AND BENEFITS  (Pursuant to [place]RS 616C.050)    Notice of Injury or Occupational Disease (Incident Report Form C-1): If an injury or occupational disease (OD) arises out of and in the course of employment, you must provide written notice to your employer as soon as practicable, but no later than 7 days after the accident or OD. Your employer shall maintain a sufficient supply of the required forms.    Claim for Compensation (Form C-4): If medical treatment is sought, the form C-4 is available at the place of initial treatment. A completed \"Claim for Compensation\" (Form C-4) must be filed within 90 days after an accident or OD. The treating physician or chiropractor must, within 3 working days after treatment, complete and mail to the employer, the employer's insurer and third-party , the Claim for Compensation.    Medical Treatment: If you require medical " and maximize level of functional independence with ADLs and functional mobility  Occupational Performance areas to address include: bathing/ shower, dressing, toilet hygiene, transfer to all surfaces, functional ambulation, functional mobility and IADLs: safety procedures  From OT standpoint, recommendation at time of d/c would be Home with family support, [address] [facility] and [facility] OT       OT Discharge Recommendation: Home with home health rehabilitation treatment for your on-the-job injury or OD, you may be required to select a physician or chiropractor from a list provided by your workers’ compensation insurer, if it has contracted with an Organization for Managed Care (MCO) or Preferred Provider Organization (PPO) or providers of health care. If your employer has not entered into a contract with an MCO or PPO, you may select a physician or chiropractor from the Panel of Physicians and Chiropractors. Any medical costs related to your industrial injury or OD will be paid by your insurer.    Temporary Total Disability (TTD): If your doctor has certified that you are unable to work for a period of at least 5 consecutive days, or 5 cumulative days in a 20-day period, or places restrictions on you that your employer does not accommodate, you may be entitled to TTD compensation.    Temporary Partial Disability (TPD): If the wage you receive upon reemployment is less than the compensation for TTD to which you are entitled, the insurer may be required to pay you TPD compensation to make up the difference. TPD can only be paid for a maximum of 24 months.    Permanent Partial Disability (PPD): When your medical condition is stable and there is an indication of a PPD as a result of your injury or OD, within 30 days, your insurer must arrange for an evaluation by a rating physician or chiropractor to determine the degree of your PPD. The amount of your PPD award depends on the date of injury, the results of the PPD evaluation, your age and wage.    Permanent Total Disability (PTD): If you are medically certified by a treating physician or chiropractor as permanently and totally disabled and have been granted a PTD status by your insurer, you are entitled to receive monthly benefits not to exceed 66 2/3% of your average monthly wage. The amount of your PTD payments is subject to reduction if you previously received a lump-sum PPD award.    Vocational Rehabilitation Services:  You may be eligible for vocational rehabilitation services if you are unable to return to the job due to a permanent physical impairment or permanent restrictions as a result of your injury or occupational disease.    Transportation and Per Linda Reimbursement: You may be eligible for travel expenses and per linda associated with medical treatment.    Reopening: You may be able to reopen your claim if your condition worsens after claim closure.     Appeal Process: If you disagree with a written determination issued by the insurer or the insurer does not respond to your request, you may appeal to the Department of Administration, , by following the instructions contained in your determination letter. You must appeal the determination within 70 days from the date of the determination letter at [address], [address], [place], [place] [address], or [address], [place], [place] [address]. If you disagree with the  decision, you may appeal to the Department of Administration, . You must file your appeal within 30 days from the date of the  decision letter at [address], [address], [place], [place] [address], or [address], [place], [place] [address]. If you disagree with a decision of an , you may file a petition for judicial review with the District Court. You must do so within 30 days of the Appeal Officer’s decision. You may be represented by an  at your own expense or you may contact the [facility] for possible representation.    [place]  for Injured Workers (NAIW): If you disagree with a  decision, you may request that NAIW represent you without charge at an  Hearing. For information regarding denial of benefits, you may contact the [facility] at: [address], [address], [place] [address], [phone], or [address],  [place] [address], [phone]    To File a Complaint with the Division: If you wish to file a complaint with the  of the Division of Industrial Relations (DIR),  please contact the Workers’ Compensation Section, [address], [address], [place], [place] [address], telephone [phone], or [address], [address], [place], [place] [address], telephone [phone].    For assistance with Workers’ Compensation Issues: You may contact the [facility] Office for Consumer Health Assistance, [address], [address], [place], [place] [address], Toll Free 1-558.239.5315, Web site: [email or web address] E-mail: [email or web address]              __________________________________________________________________                                    _________________            Employee Name / Signature                                                                                                                            Date                                                                                                                                                                                                                              D-2 (rev. 10/20)

## 2023-09-28 NOTE — PROGRESS NOTES
"Subjective:     Josiah Strong is a 42 y.o. male who presents for Work-Related Injury (Pt smashed first and second fingers on left hand @12:00pm today. Pt states the fingers are painful and going numb. )      DOI:  9/27/2023  ARIAS:  Crush injury - fingers slammed by swinging door of large trash bins  Patient states that he was placing trash into the large trash bins with the swinging sol.  He reports that the gate inadvertently swung closed and smashed his left index and middle fingers.  He noted immediate onset of pain and swelling.  He did apply ice, but he has noted increased swelling and pain to the fingers since the injury.  He states that the fingers are intermittently \"falling asleep\" and then they return to normal sensation. He cannot bend them at all due to pain.      PMH:   No pertinent past medical history to this problem  MEDS:  Medications were reviewed in EMR  ALLERGIES:  Allergies were reviewed in EMR  SOCHX:  Works as a   FH:   No pertinent family history to this problem       Objective:     /88 (BP Location: Right arm, Patient Position: Sitting)   Pulse (!) 101   Temp 37.2 °C (99 °F) (Temporal)   Resp 16   Ht 1.727 m (5' 8\")   Wt 112 kg (246 lb 14.6 oz)   SpO2 96%   BMI 37.54 kg/m²       Physical Exam  Vitals reviewed.   Constitutional:       General: He is not in acute distress.     Appearance: Normal appearance. He is not ill-appearing.   HENT:      Head: Normocephalic and atraumatic.      Nose: Nose normal.      Mouth/Throat:      Mouth: Mucous membranes are moist.      Pharynx: Oropharynx is clear.   Eyes:      Extraocular Movements: Extraocular movements intact.      Conjunctiva/sclera: Conjunctivae normal.      Pupils: Pupils are equal, round, and reactive to light.   Cardiovascular:      Rate and Rhythm: Normal rate and regular rhythm.      Pulses: Normal pulses.      Heart sounds: Normal heart sounds.   Pulmonary:      Effort: Pulmonary effort is normal. "      Breath sounds: Normal breath sounds.   Abdominal:      General: Abdomen is flat. Bowel sounds are normal.      Palpations: Abdomen is soft.   Musculoskeletal:      Left hand: Swelling, tenderness and bony tenderness present. No deformity or lacerations. Decreased range of motion.      Cervical back: Normal range of motion and neck supple.      Comments: There is swelling to the left index and left middle fingers. There is a small abrasion to the left middle finger.  There is pain to palpation over the entire index and middle fingers.  Patient is unable to move the fingers at all due to pain.  Capillary refill is less than two seconds.     Skin:     General: Skin is warm and dry.      Capillary Refill: Capillary refill takes less than 2 seconds.   Neurological:      General: No focal deficit present.      Mental Status: He is alert and oriented to person, place, and time.   Psychiatric:         Mood and Affect: Mood normal.         Behavior: Behavior normal.       RADIOLOGY RESULTS  DX-HAND 3+ LEFT    Result Date: 9/27/2023 9/27/2023 6:27 PM HISTORY/REASON FOR EXAM:  Pain/Deformity Following Trauma. Hand pain and swelling TECHNIQUE/EXAM DESCRIPTION AND NUMBER OF VIEWS:  3 views of the LEFT hand. COMPARISON: None FINDINGS: There is no fracture or dislocation. The visualized osseous structures are in anatomic alignment. The joint spaces are preserved. Bone mineralization is age-appropriate..     No acute osseous abnormality.              Assessment/Plan:       1. Crushing injury of left hand, initial encounter  - DX-HAND 3+ LEFT    Released to Restricted Duty FROM 9/27/2023 TO 10/1/2023  1.  Crush injury, left index and middle finger  Restrictions per D39  No use of left hand until recheck on 10/1/2023  Finger splints as needed for comfort  RICE therapy, gentle ROM as tolerated  Return sooner if worse       Differential diagnosis, natural history, supportive care, and indications for immediate follow-up  discussed.

## 2024-04-17 ENCOUNTER — OFFICE VISIT (OUTPATIENT)
Dept: MEDICAL GROUP | Facility: PHYSICIAN GROUP | Age: 44
End: 2024-04-17
Payer: COMMERCIAL

## 2024-04-17 VITALS
RESPIRATION RATE: 20 BRPM | TEMPERATURE: 97.6 F | HEIGHT: 68 IN | HEART RATE: 84 BPM | SYSTOLIC BLOOD PRESSURE: 118 MMHG | OXYGEN SATURATION: 98 % | WEIGHT: 253 LBS | BODY MASS INDEX: 38.34 KG/M2 | DIASTOLIC BLOOD PRESSURE: 68 MMHG

## 2024-04-17 DIAGNOSIS — G47.33 OSA (OBSTRUCTIVE SLEEP APNEA): ICD-10-CM

## 2024-04-17 DIAGNOSIS — R21 RASH AND NONSPECIFIC SKIN ERUPTION: ICD-10-CM

## 2024-04-17 DIAGNOSIS — M25.511 ACUTE PAIN OF RIGHT SHOULDER: ICD-10-CM

## 2024-04-17 DIAGNOSIS — Z13.6 SCREENING FOR CARDIOVASCULAR CONDITION: ICD-10-CM

## 2024-04-17 DIAGNOSIS — E55.9 VITAMIN D DEFICIENCY: ICD-10-CM

## 2024-04-17 DIAGNOSIS — M79.645 FINGER PAIN, LEFT: ICD-10-CM

## 2024-04-17 DIAGNOSIS — R22.32 LUMP ON FINGER, LEFT: ICD-10-CM

## 2024-04-17 DIAGNOSIS — Z11.59 NEED FOR HEPATITIS C SCREENING TEST: ICD-10-CM

## 2024-04-17 DIAGNOSIS — Z13.228 SCREENING FOR ENDOCRINE, METABOLIC AND IMMUNITY DISORDER: ICD-10-CM

## 2024-04-17 DIAGNOSIS — Z78.9 HEPATITIS B VACCINATION STATUS UNKNOWN: ICD-10-CM

## 2024-04-17 DIAGNOSIS — Z13.0 SCREENING FOR ENDOCRINE, METABOLIC AND IMMUNITY DISORDER: ICD-10-CM

## 2024-04-17 DIAGNOSIS — Z11.3 SCREENING EXAMINATION FOR SEXUALLY TRANSMITTED DISEASE: ICD-10-CM

## 2024-04-17 DIAGNOSIS — Z13.29 SCREENING FOR ENDOCRINE, METABOLIC AND IMMUNITY DISORDER: ICD-10-CM

## 2024-04-17 PROBLEM — K40.20 INGUINAL HERNIA, BILATERAL: Status: RESOLVED | Noted: 2018-04-24 | Resolved: 2024-04-17

## 2024-04-17 PROCEDURE — 3078F DIAST BP <80 MM HG: CPT

## 2024-04-17 PROCEDURE — 3074F SYST BP LT 130 MM HG: CPT

## 2024-04-17 PROCEDURE — 99214 OFFICE O/P EST MOD 30 MIN: CPT

## 2024-04-17 ASSESSMENT — ENCOUNTER SYMPTOMS
COUGH: 0
WEIGHT LOSS: 0
WHEEZING: 0
TINGLING: 0
NAUSEA: 0
ABDOMINAL PAIN: 0
CHILLS: 0
VOMITING: 0
HEADACHES: 0
CONSTIPATION: 0
DIZZINESS: 0
BLOOD IN STOOL: 0
PALPITATIONS: 0
SHORTNESS OF BREATH: 0
FEVER: 0
DIARRHEA: 0

## 2024-04-17 ASSESSMENT — PATIENT HEALTH QUESTIONNAIRE - PHQ9: CLINICAL INTERPRETATION OF PHQ2 SCORE: 0

## 2024-04-17 NOTE — PROGRESS NOTES
Subjective:     Chief Complaint   Patient presents with    Establish Care    Apnea    Finger Pain     Left ring and middle finger     Rash     On left leg x 1 years      Josiah Strong is a 43 y.o. male, who is here today to establish care and discuss obstructive sleep apnea.  Patient is unable to recall his last PCP as it has been several years..    Problem   Mert (Obstructive Sleep Apnea)    Was diagnosed 6 years ago with severe sleep apnea.  At this time he could not afford a CPAP machine.  Patient's girlfriend/significant other knows a provider within a different organization that reached out so that way he can have a CPAP arranged.  Patient does not have a provider to manage his condition at this time.  Endorses is snoring, and has witnessed apneic events.     Finger Pain, Left    Patient reports pain to his left ring and middle finger for the last 6 to 8 months.  Patient points to a small bump to his left ring finger at the proximal interphalangeal joint.  Denies any trauma/injury, loss of sensation, fever/chills. Denies any bruising.  Endorses swelling and is able to continue with full range of motion with associated pain.     Lump On Finger, Left   Rash and Nonspecific Skin Eruption    Patient noticed approximately about a year ago a rash or skin lesion to his left outer thigh.  Patient has tried a prescription steroid that he has had in the past.  Patient states that this steroid topical cream did help lessen the skin lesion but it remains after using it for a period of time and worsens after cessation of topical.  Patient states that it is not painful it just is itchy and bothersome.     Right Shoulder Pain    Patient endorses right shoulder pain.  Patient has had this going on for the last 2 to 3 months mostly a stable consistent pain.  Patient reports that when he is weak increases it will worsen the pain and with rest it does not go away.  Patient has not tried any routine NSAIDs or other  "topicals to this area.  Patient has not seen physical therapy nor has he had any imaging done.  Denies any fever, chills.  Denies any trauma/injury     Inguinal Hernia, Bilateral (Resolved)    Repaired in 2018  No concerns     Allergies: Patient has no known allergies.    No current outpatient medications on file.     Review of Systems   Constitutional:  Negative for chills, fever and weight loss.   Respiratory:  Negative for cough, shortness of breath and wheezing.    Cardiovascular:  Negative for chest pain, palpitations and leg swelling.   Gastrointestinal:  Negative for abdominal pain, blood in stool, constipation, diarrhea, nausea and vomiting.   Genitourinary:  Negative for hematuria.   Skin:  Negative for itching and rash.   Neurological:  Negative for dizziness, tingling and headaches.     Health Maintenance: Completed    Objective:     /68   Pulse 84   Temp 36.4 °C (97.6 °F) (Temporal)   Resp 20   Ht 1.727 m (5' 8\")   Wt 115 kg (253 lb)   SpO2 98%  Body mass index is 38.47 kg/m².    Physical Exam  Vitals reviewed.   Constitutional:       General: He is not in acute distress.     Appearance: Normal appearance. He is not ill-appearing.   Cardiovascular:      Rate and Rhythm: Normal rate and regular rhythm.      Heart sounds: Normal heart sounds.   Pulmonary:      Effort: Pulmonary effort is normal.      Breath sounds: Normal breath sounds.   Abdominal:      General: Abdomen is flat.      Palpations: Abdomen is soft.   Musculoskeletal:         General: Normal range of motion.      Right hand: Normal.      Left hand: Swelling, deformity and tenderness present. No lacerations or bony tenderness. Normal range of motion. Normal strength. Normal sensation. Normal capillary refill. Normal pulse.      Cervical back: Normal range of motion.      Comments: Tender and mobile 1 cm lump noted to the left ring finger at the proximal interphalangeal joint.    Skin:     General: Skin is warm and dry.             " Comments: Two dry scale like lesions approximately 4 to 6 cm in diameter each, located to the lateral aspect of his left thigh.   Neurological:      General: No focal deficit present.      Mental Status: He is alert.   Psychiatric:         Mood and Affect: Mood normal.         Behavior: Behavior normal.         Thought Content: Thought content normal.         Judgment: Judgment normal.       Assessment & Plan:     The following plan was discussed through shared decision making with the patient:    1. WILMER (obstructive sleep apnea)  Chronic, uncontrolled.  Since patient was diagnosed with sleep apnea 6 years ago and has not been well-controlled we will refer him to sleep medicine so that we his CPAP machine can be titrated accordingly.  - Referral to Pulmonary and Sleep Medicine    2. Finger pain, left  3. Lump on finger, left  Acute, uncomplicated.  With lump present to his ring finger causing pain we will refer him to orthopedics/hand specialty in order to have this further looked at and possibly surgically removed.  Advised patient he may use Voltaren/diclofenac gel in the meantime to help provide some pain relief and decrease inflammation and swelling in the meantime.  - Referral to Orthopedics    4. Acute pain of right shoulder  Acute, uncomplicated.  Provided patient with stretching exercises.  Advised patient to start taking a NSAID regimen of 400 mg daily for the next 2 weeks to see if this helps improve his shoulder pain.  May consider physical therapy in the future.    5. Rash and nonspecific skin eruption  Chronic, uncontrolled.  Rash presentation is very similar to psoriasis however with his darker skin tone it is hard to completely ascertain this.  Will refer to dermatology for further follow-up and management.  - Referral to Dermatology    6. Hepatitis B vaccination status unknown  See #7  - HEP B SURFACE AB; Future    7. Screening for endocrine, metabolic and immunity disorder  Patient establishing care  in office today.  Due for updated lab work and screenings.  Will follow-up in office in approximately 3 months to go over these lab results and further management.  - CBC WITH DIFFERENTIAL; Future  - Comp Metabolic Panel; Future  - HEMOGLOBIN A1C; Future  - TSH WITH REFLEX TO FT4; Future    8. Screening for cardiovascular condition  See #7  - Lipid Profile; Future    9. Vitamin D deficiency  See #7  - VITAMIN D,25 HYDROXY (DEFICIENCY); Future    10. Screening examination for sexually transmitted disease  See #7  - HIV AG/AB COMBO ASSAY SCREENING; Future    11. Need for hepatitis C screening test  See #7  - HEP C VIRUS ANTIBODY; Future      Return in about 3 months (around 7/17/2024) for Labs, Med Check.         Please note that this dictation was created using voice recognition software. I have made every reasonable attempt to correct obvious errors, but I expect that there are errors of grammar and possibly content that I did not discover before finalizing the note.    NATHALIE Metcalf  [facility]  [facility]

## 2024-04-30 LAB
25(OH)D3+25(OH)D2 SERPL-MCNC: 14.1 NG/ML (ref 30–100)
ALBUMIN SERPL-MCNC: 4.4 G/DL (ref 4.1–5.1)
ALBUMIN/GLOB SERPL: 1.7 {RATIO} (ref 1.2–2.2)
ALP SERPL-CCNC: 106 IU/L (ref 44–121)
ALT SERPL-CCNC: 168 IU/L (ref 0–44)
AST SERPL-CCNC: 131 IU/L (ref 0–40)
BASOPHILS # BLD AUTO: 0 X10E3/UL (ref 0–0.2)
BASOPHILS NFR BLD AUTO: 0 %
BILIRUB SERPL-MCNC: 0.7 MG/DL (ref 0–1.2)
BUN SERPL-MCNC: 9 MG/DL (ref 6–24)
BUN/CREAT SERPL: 14 (ref 9–20)
CALCIUM SERPL-MCNC: 9.3 MG/DL (ref 8.7–10.2)
CHLORIDE SERPL-SCNC: 106 MMOL/L (ref 96–106)
CHOLEST SERPL-MCNC: 209 MG/DL (ref 100–199)
CO2 SERPL-SCNC: 20 MMOL/L (ref 20–29)
CREAT SERPL-MCNC: 0.66 MG/DL (ref 0.76–1.27)
EGFRCR SERPLBLD CKD-EPI 2021: 119 ML/MIN/1.73
EOSINOPHIL # BLD AUTO: 0.3 X10E3/UL (ref 0–0.4)
EOSINOPHIL NFR BLD AUTO: 4 %
ERYTHROCYTE [DISTWIDTH] IN BLOOD BY AUTOMATED COUNT: 12.9 % (ref 11.6–15.4)
GLOBULIN SER CALC-MCNC: 2.6 G/DL (ref 1.5–4.5)
GLUCOSE SERPL-MCNC: 85 MG/DL (ref 70–99)
HBA1C MFR BLD: 6.3 % (ref 4.8–5.6)
HBV SURFACE AB SER QL: NON REACTIVE
HCT VFR BLD AUTO: 51.5 % (ref 37.5–51)
HCV IGG SERPL QL IA: NON REACTIVE
HDLC SERPL-MCNC: 42 MG/DL
HGB BLD-MCNC: 17.4 G/DL (ref 13–17.7)
HIV 1+2 AB+HIV1 P24 AG SERPL QL IA: NON REACTIVE
IMM GRANULOCYTES # BLD AUTO: 0 X10E3/UL (ref 0–0.1)
IMM GRANULOCYTES NFR BLD AUTO: 0 %
IMMATURE CELLS  115398: ABNORMAL
LABORATORY COMMENT REPORT: ABNORMAL
LDLC SERPL CALC-MCNC: 139 MG/DL (ref 0–99)
LYMPHOCYTES # BLD AUTO: 3.4 X10E3/UL (ref 0.7–3.1)
LYMPHOCYTES NFR BLD AUTO: 38 %
MCH RBC QN AUTO: 31.9 PG (ref 26.6–33)
MCHC RBC AUTO-ENTMCNC: 33.8 G/DL (ref 31.5–35.7)
MCV RBC AUTO: 94 FL (ref 79–97)
MONOCYTES # BLD AUTO: 0.5 X10E3/UL (ref 0.1–0.9)
MONOCYTES NFR BLD AUTO: 6 %
MORPHOLOGY BLD-IMP: ABNORMAL
NEUTROPHILS # BLD AUTO: 4.6 X10E3/UL (ref 1.4–7)
NEUTROPHILS NFR BLD AUTO: 52 %
NRBC BLD AUTO-RTO: ABNORMAL %
PLATELET # BLD AUTO: 201 X10E3/UL (ref 150–450)
POTASSIUM SERPL-SCNC: 4 MMOL/L (ref 3.5–5.2)
PROT SERPL-MCNC: 7 G/DL (ref 6–8.5)
RBC # BLD AUTO: 5.46 X10E6/UL (ref 4.14–5.8)
SODIUM SERPL-SCNC: 140 MMOL/L (ref 134–144)
TRIGL SERPL-MCNC: 155 MG/DL (ref 0–149)
TSH SERPL DL<=0.005 MIU/L-ACNC: 0.97 UIU/ML (ref 0.45–4.5)
VLDLC SERPL CALC-MCNC: 28 MG/DL (ref 5–40)
WBC # BLD AUTO: 9 X10E3/UL (ref 3.4–10.8)

## 2024-05-08 PROBLEM — M67.442 GANGLION OF HAND, LEFT: Status: ACTIVE | Noted: 2024-05-08

## 2024-05-16 ENCOUNTER — TELEPHONE (OUTPATIENT)
Dept: HEALTH INFORMATION MANAGEMENT | Facility: OTHER | Age: 44
End: 2024-05-16
Payer: COMMERCIAL

## 2024-07-17 ENCOUNTER — OFFICE VISIT (OUTPATIENT)
Dept: MEDICAL GROUP | Facility: PHYSICIAN GROUP | Age: 44
End: 2024-07-17
Payer: COMMERCIAL

## 2024-07-17 VITALS
SYSTOLIC BLOOD PRESSURE: 124 MMHG | OXYGEN SATURATION: 94 % | TEMPERATURE: 97.7 F | RESPIRATION RATE: 16 BRPM | HEART RATE: 95 BPM | DIASTOLIC BLOOD PRESSURE: 60 MMHG | BODY MASS INDEX: 37.62 KG/M2 | HEIGHT: 69 IN | WEIGHT: 254 LBS

## 2024-07-17 DIAGNOSIS — R73.03 PREDIABETES: ICD-10-CM

## 2024-07-17 DIAGNOSIS — E78.5 DYSLIPIDEMIA: ICD-10-CM

## 2024-07-17 DIAGNOSIS — R79.89 BLOOD CREATININE DECREASED COMPARED WITH PRIOR MEASUREMENT: ICD-10-CM

## 2024-07-17 DIAGNOSIS — R74.8 ELEVATED LIVER ENZYMES: ICD-10-CM

## 2024-07-17 DIAGNOSIS — E55.9 VITAMIN D DEFICIENCY: ICD-10-CM

## 2024-07-17 PROBLEM — M79.645 FINGER PAIN, LEFT: Status: RESOLVED | Noted: 2024-04-17 | Resolved: 2024-07-17

## 2024-07-17 PROBLEM — R22.32 LUMP ON FINGER, LEFT: Status: RESOLVED | Noted: 2024-04-17 | Resolved: 2024-07-17

## 2024-07-17 PROBLEM — M67.442 GANGLION OF HAND, LEFT: Status: RESOLVED | Noted: 2024-05-08 | Resolved: 2024-07-17

## 2024-07-17 PROCEDURE — 3074F SYST BP LT 130 MM HG: CPT

## 2024-07-17 PROCEDURE — 99214 OFFICE O/P EST MOD 30 MIN: CPT

## 2024-07-17 PROCEDURE — 3078F DIAST BP <80 MM HG: CPT

## 2024-07-17 RX ORDER — ERGOCALCIFEROL 1.25 MG/1
50000 CAPSULE ORAL
Qty: 12 CAPSULE | Refills: 3 | Status: SHIPPED | OUTPATIENT
Start: 2024-07-17

## 2024-07-17 ASSESSMENT — FIBROSIS 4 INDEX: FIB4 SCORE: 2.21

## 2025-01-15 ENCOUNTER — HOSPITAL ENCOUNTER (OUTPATIENT)
Dept: LAB | Facility: MEDICAL CENTER | Age: 45
End: 2025-01-15
Payer: COMMERCIAL

## 2025-01-15 ENCOUNTER — HOSPITAL ENCOUNTER (OUTPATIENT)
Dept: LAB | Facility: MEDICAL CENTER | Age: 45
End: 2025-01-15
Attending: INTERNAL MEDICINE
Payer: COMMERCIAL

## 2025-01-15 DIAGNOSIS — Z13.29 SCREENING FOR ENDOCRINE, METABOLIC AND IMMUNITY DISORDER: ICD-10-CM

## 2025-01-15 DIAGNOSIS — Z78.9 HEPATITIS B VACCINATION STATUS UNKNOWN: ICD-10-CM

## 2025-01-15 DIAGNOSIS — Z13.228 SCREENING FOR ENDOCRINE, METABOLIC AND IMMUNITY DISORDER: ICD-10-CM

## 2025-01-15 DIAGNOSIS — Z11.59 NEED FOR HEPATITIS C SCREENING TEST: ICD-10-CM

## 2025-01-15 DIAGNOSIS — R74.8 ELEVATED LIVER ENZYMES: ICD-10-CM

## 2025-01-15 DIAGNOSIS — Z11.3 SCREENING EXAMINATION FOR SEXUALLY TRANSMITTED DISEASE: ICD-10-CM

## 2025-01-15 DIAGNOSIS — E78.5 DYSLIPIDEMIA: ICD-10-CM

## 2025-01-15 DIAGNOSIS — Z13.0 SCREENING FOR ENDOCRINE, METABOLIC AND IMMUNITY DISORDER: ICD-10-CM

## 2025-01-15 DIAGNOSIS — R73.03 PREDIABETES: ICD-10-CM

## 2025-01-15 LAB
25(OH)D3 SERPL-MCNC: 53 NG/ML (ref 30–100)
ALBUMIN SERPL BCP-MCNC: 4.8 G/DL (ref 3.2–4.9)
ALBUMIN/GLOB SERPL: 1.5 G/DL
ALP SERPL-CCNC: 106 U/L (ref 30–99)
ALT SERPL-CCNC: 68 U/L (ref 2–50)
ANION GAP SERPL CALC-SCNC: 13 MMOL/L (ref 7–16)
AST SERPL-CCNC: 37 U/L (ref 12–45)
BASOPHILS # BLD AUTO: 0.5 % (ref 0–1.8)
BASOPHILS # BLD: 0.05 K/UL (ref 0–0.12)
BILIRUB SERPL-MCNC: 0.4 MG/DL (ref 0.1–1.5)
BUN SERPL-MCNC: 13 MG/DL (ref 8–22)
CALCIUM ALBUM COR SERPL-MCNC: 8.8 MG/DL (ref 8.5–10.5)
CALCIUM SERPL-MCNC: 9.4 MG/DL (ref 8.5–10.5)
CHLORIDE SERPL-SCNC: 104 MMOL/L (ref 96–112)
CHOLEST SERPL-MCNC: 218 MG/DL (ref 100–199)
CO2 SERPL-SCNC: 23 MMOL/L (ref 20–33)
CREAT SERPL-MCNC: 0.68 MG/DL (ref 0.5–1.4)
EOSINOPHIL # BLD AUTO: 0.35 K/UL (ref 0–0.51)
EOSINOPHIL NFR BLD: 3.4 % (ref 0–6.9)
ERYTHROCYTE [DISTWIDTH] IN BLOOD BY AUTOMATED COUNT: 45.7 FL (ref 35.9–50)
ERYTHROCYTE [DISTWIDTH] IN BLOOD BY AUTOMATED COUNT: 46.3 FL (ref 35.9–50)
EST. AVERAGE GLUCOSE BLD GHB EST-MCNC: 120 MG/DL
ESTRADIOL SERPL-MCNC: 30.8 PG/ML
FSH SERPL-ACNC: 6.5 MIU/ML (ref 1.5–12.4)
GFR SERPLBLD CREATININE-BSD FMLA CKD-EPI: 117 ML/MIN/1.73 M 2
GLOBULIN SER CALC-MCNC: 3.1 G/DL (ref 1.9–3.5)
GLUCOSE SERPL-MCNC: 90 MG/DL (ref 65–99)
HBA1C MFR BLD: 5.8 % (ref 4–5.6)
HBV SURFACE AB SERPL IA-ACNC: <3.5 MIU/ML (ref 0–10)
HCT VFR BLD AUTO: 53 % (ref 42–52)
HCT VFR BLD AUTO: 55.5 % (ref 42–52)
HCV AB SER QL: NORMAL
HDLC SERPL-MCNC: 46 MG/DL
HGB BLD-MCNC: 17.8 G/DL (ref 14–18)
HGB BLD-MCNC: 18.3 G/DL (ref 14–18)
HIV 1+2 AB+HIV1 P24 AG SERPL QL IA: NORMAL
IMM GRANULOCYTES # BLD AUTO: 0.04 K/UL (ref 0–0.11)
IMM GRANULOCYTES NFR BLD AUTO: 0.4 % (ref 0–0.9)
LDLC SERPL CALC-MCNC: 151 MG/DL
LH SERPL-ACNC: 6.1 IU/L (ref 1.7–8.6)
LYMPHOCYTES # BLD AUTO: 3.07 K/UL (ref 1–4.8)
LYMPHOCYTES NFR BLD: 30.2 % (ref 22–41)
MCH RBC QN AUTO: 31.1 PG (ref 27–33)
MCH RBC QN AUTO: 31.4 PG (ref 27–33)
MCHC RBC AUTO-ENTMCNC: 33 G/DL (ref 32.3–36.5)
MCHC RBC AUTO-ENTMCNC: 33.6 G/DL (ref 32.3–36.5)
MCV RBC AUTO: 93.5 FL (ref 81.4–97.8)
MCV RBC AUTO: 94.4 FL (ref 81.4–97.8)
MONOCYTES # BLD AUTO: 0.77 K/UL (ref 0–0.85)
MONOCYTES NFR BLD AUTO: 7.6 % (ref 0–13.4)
NEUTROPHILS # BLD AUTO: 5.89 K/UL (ref 1.82–7.42)
NEUTROPHILS NFR BLD: 57.9 % (ref 44–72)
NRBC # BLD AUTO: 0 K/UL
NRBC BLD-RTO: 0 /100 WBC (ref 0–0.2)
PLATELET # BLD AUTO: 209 K/UL (ref 164–446)
PLATELET # BLD AUTO: 209 K/UL (ref 164–446)
PMV BLD AUTO: 13.1 FL (ref 9–12.9)
PMV BLD AUTO: 13.4 FL (ref 9–12.9)
POTASSIUM SERPL-SCNC: 4.3 MMOL/L (ref 3.6–5.5)
PROLACTIN SERPL-MCNC: 4.16 NG/ML (ref 2.1–17.7)
PROT SERPL-MCNC: 7.9 G/DL (ref 6–8.2)
RBC # BLD AUTO: 5.67 M/UL (ref 4.7–6.1)
RBC # BLD AUTO: 5.88 M/UL (ref 4.7–6.1)
SODIUM SERPL-SCNC: 140 MMOL/L (ref 135–145)
T3FREE SERPL-MCNC: 4.11 PG/ML (ref 2–4.4)
T4 FREE SERPL-MCNC: 1.38 NG/DL (ref 0.93–1.7)
TESTOST SERPL-MCNC: 606 NG/DL (ref 175–781)
THYROPEROXIDASE AB SERPL-ACNC: 14 IU/ML (ref 0–9)
TRIGL SERPL-MCNC: 106 MG/DL (ref 0–149)
TSH SERPL DL<=0.005 MIU/L-ACNC: 0.81 UIU/ML (ref 0.38–5.33)
TSH SERPL-ACNC: 0.78 UIU/ML (ref 0.35–5.5)
VIT B12 SERPL-MCNC: 576 PG/ML (ref 211–911)
WBC # BLD AUTO: 10.2 K/UL (ref 4.8–10.8)
WBC # BLD AUTO: 9.9 K/UL (ref 4.8–10.8)

## 2025-01-15 PROCEDURE — 86706 HEP B SURFACE ANTIBODY: CPT

## 2025-01-15 PROCEDURE — 84439 ASSAY OF FREE THYROXINE: CPT

## 2025-01-15 PROCEDURE — 82670 ASSAY OF TOTAL ESTRADIOL: CPT

## 2025-01-15 PROCEDURE — 85027 COMPLETE CBC AUTOMATED: CPT

## 2025-01-15 PROCEDURE — 36415 COLL VENOUS BLD VENIPUNCTURE: CPT

## 2025-01-15 PROCEDURE — 84270 ASSAY OF SEX HORMONE GLOBUL: CPT

## 2025-01-15 PROCEDURE — 82306 VITAMIN D 25 HYDROXY: CPT

## 2025-01-15 PROCEDURE — 86803 HEPATITIS C AB TEST: CPT

## 2025-01-15 PROCEDURE — 84153 ASSAY OF PSA TOTAL: CPT

## 2025-01-15 PROCEDURE — 86376 MICROSOMAL ANTIBODY EACH: CPT

## 2025-01-15 PROCEDURE — 85025 COMPLETE CBC W/AUTO DIFF WBC: CPT

## 2025-01-15 PROCEDURE — 83001 ASSAY OF GONADOTROPIN (FSH): CPT

## 2025-01-15 PROCEDURE — 83036 HEMOGLOBIN GLYCOSYLATED A1C: CPT

## 2025-01-15 PROCEDURE — 84443 ASSAY THYROID STIM HORMONE: CPT

## 2025-01-15 PROCEDURE — 84403 ASSAY OF TOTAL TESTOSTERONE: CPT

## 2025-01-15 PROCEDURE — 84443 ASSAY THYROID STIM HORMONE: CPT | Mod: 91

## 2025-01-15 PROCEDURE — 82607 VITAMIN B-12: CPT

## 2025-01-15 PROCEDURE — 84146 ASSAY OF PROLACTIN: CPT

## 2025-01-15 PROCEDURE — 87389 HIV-1 AG W/HIV-1&-2 AB AG IA: CPT

## 2025-01-15 PROCEDURE — 80061 LIPID PANEL: CPT

## 2025-01-15 PROCEDURE — 84481 FREE ASSAY (FT-3): CPT

## 2025-01-15 PROCEDURE — 80053 COMPREHEN METABOLIC PANEL: CPT

## 2025-01-15 PROCEDURE — 83002 ASSAY OF GONADOTROPIN (LH): CPT

## 2025-01-17 ENCOUNTER — APPOINTMENT (OUTPATIENT)
Dept: MEDICAL GROUP | Facility: PHYSICIAN GROUP | Age: 45
End: 2025-01-17
Payer: COMMERCIAL

## 2025-01-17 LAB
PSA FREE MFR SERPL: NORMAL %
PSA FREE SERPL-MCNC: NORMAL NG/ML
PSA SERPL-MCNC: 0.3 NG/ML (ref 0–4)
SHBG SERPL-SCNC: 60 NMOL/L (ref 17–56)

## 2025-01-29 ENCOUNTER — OFFICE VISIT (OUTPATIENT)
Dept: MEDICAL GROUP | Facility: PHYSICIAN GROUP | Age: 45
End: 2025-01-29
Payer: COMMERCIAL

## 2025-01-29 VITALS
HEART RATE: 94 BPM | SYSTOLIC BLOOD PRESSURE: 128 MMHG | BODY MASS INDEX: 38.4 KG/M2 | OXYGEN SATURATION: 97 % | DIASTOLIC BLOOD PRESSURE: 70 MMHG | TEMPERATURE: 98.2 F | RESPIRATION RATE: 16 BRPM | WEIGHT: 253.4 LBS | HEIGHT: 68 IN

## 2025-01-29 DIAGNOSIS — E78.5 DYSLIPIDEMIA: ICD-10-CM

## 2025-01-29 DIAGNOSIS — R19.09 GROIN SWELLING: ICD-10-CM

## 2025-01-29 DIAGNOSIS — R79.89 ELEVATED LFTS: ICD-10-CM

## 2025-01-29 DIAGNOSIS — R76.8 ANTI-TPO ANTIBODIES PRESENT: ICD-10-CM

## 2025-01-29 DIAGNOSIS — R73.03 PREDIABETES: ICD-10-CM

## 2025-01-29 ASSESSMENT — FIBROSIS 4 INDEX: FIB4 SCORE: 0.97

## 2025-01-29 ASSESSMENT — PATIENT HEALTH QUESTIONNAIRE - PHQ9: CLINICAL INTERPRETATION OF PHQ2 SCORE: 0

## 2025-01-29 NOTE — PROGRESS NOTES
"Subjective:     Chief Complaint   Patient presents with    Lab Results     History of Present Illness  The patient is a 44-year-old male here to follow up on lab results and discuss any other health concerns.    He has been under the care of Dr. Peres, an endocrinologist, who has ordered testosterone level tests. He is not currently on testosterone therapy and has a scheduled appointment with endocrinology in March 2025.    He reports experiencing intermittent swelling in his groin or upper thigh area, which he describes as a small bulge or bump. The swelling is not associated with any redness, bruising, numbness, or tingling in his lower extremities. It is not painful but can be felt when he sits or bends down at times. He likens the sensation to that of a balloon. He has a history of two hernia repairs and notes that the current pain is located more towards his groin. The swelling appears to be random, with no identifiable triggers. He has observed that it tends to occur when he is fatigued or has exerted himself more than usual. There are no changes in skin color or external appearance. He has experienced weight fluctuations, with a recent weight loss followed by a regain. He has been making efforts to improve his diet. He does not own a blood pressure cuff. He also reports experiencing pruritus in his legs, which he initially attributed to a pimple or bump, but there are no visible bumps on his skin. The discomfort does not interfere with his daily activities.    He is still seeing sleep medicine and using a CPAP machine.    Allergies: Patient has no known allergies.  ROS per HPI  Health Maintenance: Deferred   Objective:     /70 (BP Location: Left arm, Patient Position: Sitting, BP Cuff Size: Adult)   Pulse 94   Temp 36.8 °C (98.2 °F) (Temporal)   Resp 16   Ht 1.727 m (5' 8\")   Wt 115 kg (253 lb 6.4 oz)   SpO2 97%   BMI 38.53 kg/m²  Body mass index is 38.53 kg/m².     Physical Exam  Vitals reviewed. "   Constitutional:       General: He is not in acute distress.     Appearance: Normal appearance. He is not ill-appearing.   Cardiovascular:      Rate and Rhythm: Normal rate and regular rhythm.      Pulses: Normal pulses.      Heart sounds: Normal heart sounds.   Pulmonary:      Effort: Pulmonary effort is normal. No respiratory distress.      Breath sounds: Normal breath sounds.   Musculoskeletal:      Right lower leg: No edema.      Left lower leg: No edema.   Skin:     Coloration: Skin is not jaundiced or pale.   Neurological:      General: No focal deficit present.      Mental Status: He is alert and oriented to person, place, and time.   Psychiatric:         Mood and Affect: Mood normal.         Behavior: Behavior normal.         Thought Content: Thought content normal.         Judgment: Judgment normal.        Results  Laboratory Studies  Blood count is normal. Testosterone level is on the higher end of normal. Kidney function is normal. Liver function shows a slight elevation in two of the three tests. Total cholesterol is 218, LDL is 151, HDL is 46. A1c indicates prediabetes. Vitamin D levels are normal. Presence of TPO antibodies suggesting possible autoimmune condition. Thyroid hormone levels are normal.    Results for orders placed or performed during the hospital encounter of 01/15/25   Lipid Profile    Collection Time: 01/15/25 10:33 AM   Result Value Ref Range    Cholesterol,Tot 218 (H) 100 - 199 mg/dL    Triglycerides 106 0 - 149 mg/dL    HDL 46 >=40 mg/dL     (H) <100 mg/dL   Comp Metabolic Panel    Collection Time: 01/15/25 10:33 AM   Result Value Ref Range    Sodium 140 135 - 145 mmol/L    Potassium 4.3 3.6 - 5.5 mmol/L    Chloride 104 96 - 112 mmol/L    Co2 23 20 - 33 mmol/L    Anion Gap 13.0 7.0 - 16.0    Glucose 90 65 - 99 mg/dL    Bun 13 8 - 22 mg/dL    Creatinine 0.68 0.50 - 1.40 mg/dL    Calcium 9.4 8.5 - 10.5 mg/dL    Correct Calcium 8.8 8.5 - 10.5 mg/dL    AST(SGOT) 37 12 - 45 U/L     ALT(SGPT) 68 (H) 2 - 50 U/L    Alkaline Phosphatase 106 (H) 30 - 99 U/L    Total Bilirubin 0.4 0.1 - 1.5 mg/dL    Albumin 4.8 3.2 - 4.9 g/dL    Total Protein 7.9 6.0 - 8.2 g/dL    Globulin 3.1 1.9 - 3.5 g/dL    A-G Ratio 1.5 g/dL   HEMOGLOBIN A1C    Collection Time: 01/15/25 10:33 AM   Result Value Ref Range    Glycohemoglobin 5.8 (H) 4.0 - 5.6 %    Est Avg Glucose 120 mg/dL   CBC WITH DIFFERENTIAL    Collection Time: 01/15/25 10:33 AM   Result Value Ref Range    WBC 10.2 4.8 - 10.8 K/uL    RBC 5.88 4.70 - 6.10 M/uL    Hemoglobin 18.3 (H) 14.0 - 18.0 g/dL    Hematocrit 55.5 (H) 42.0 - 52.0 %    MCV 94.4 81.4 - 97.8 fL    MCH 31.1 27.0 - 33.0 pg    MCHC 33.0 32.3 - 36.5 g/dL    RDW 46.3 35.9 - 50.0 fL    Platelet Count 209 164 - 446 K/uL    MPV 13.4 (H) 9.0 - 12.9 fL    Neutrophils-Polys 57.90 44.00 - 72.00 %    Lymphocytes 30.20 22.00 - 41.00 %    Monocytes 7.60 0.00 - 13.40 %    Eosinophils 3.40 0.00 - 6.90 %    Basophils 0.50 0.00 - 1.80 %    Immature Granulocytes 0.40 0.00 - 0.90 %    Nucleated RBC 0.00 0.00 - 0.20 /100 WBC    Neutrophils (Absolute) 5.89 1.82 - 7.42 K/uL    Lymphs (Absolute) 3.07 1.00 - 4.80 K/uL    Monos (Absolute) 0.77 0.00 - 0.85 K/uL    Eos (Absolute) 0.35 0.00 - 0.51 K/uL    Baso (Absolute) 0.05 0.00 - 0.12 K/uL    Immature Granulocytes (abs) 0.04 0.00 - 0.11 K/uL    NRBC (Absolute) 0.00 K/uL   HEP C VIRUS ANTIBODY    Collection Time: 01/15/25 10:33 AM   Result Value Ref Range    Hepatitis C Antibody Non-Reactive Non-Reactive   HIV AG/AB COMBO ASSAY SCREENING    Collection Time: 01/15/25 10:33 AM   Result Value Ref Range    HIV Ag/Ab Combo Assay Non-Reactive Non Reactive   TSH WITH REFLEX TO FT4    Collection Time: 01/15/25 10:33 AM   Result Value Ref Range    TSH 0.808 0.380 - 5.330 uIU/mL   HEP B SURFACE AB    Collection Time: 01/15/25 10:33 AM   Result Value Ref Range    Hep B Surface Antibody Quant <3.50 0.00 - 10.00 mIU/mL   ESTIMATED GFR    Collection Time: 01/15/25 10:33 AM   Result  Value Ref Range    GFR (CKD-EPI) 117 >60 mL/min/1.73 m 2      Assessment and Plan:     The following treatment plan was discussed through shared decision making with the patient:    1. Prediabetes  HEMOGLOBIN A1C      2. Dyslipidemia  Lipid Profile      3. Elevated LFTs  Comp Metabolic Panel      4. Anti-TPO antibodies present        5. Groin swelling          Assessment & Plan  1. Prediabetes.  His A1c level has improved from 6.3 to 5.8 over the past 9 months, indicating effective management of his prediabetic condition. He is advised to continue dietary modifications, focusing on reducing carbohydrate intake and avoiding processed carbohydrates such as rice, tortillas, breads, crackers, and chips. He should also limit saturated fatty foods and opt for lean proteins like chicken and fish, and consume red meat in moderation. A follow-up A1c test will be ordered to be completed in 6 months.    2. Hypercholesterolemia.  His total cholesterol is 218 mg/dL, and LDL is 151 mg/dL, which is above the desired level of less than 100 mg/dL. His 10-year cardiovascular event risk is calculated to be 2.6%, which is considered low. He is advised to continue dietary modifications to help lower his cholesterol levels, focusing on reducing carbohydrate intake and avoiding processed carbohydrates such as rice, tortillas, breads, crackers, and chips. He should also limit saturated fatty foods and opt for lean proteins like chicken and fish, and consume red meat in moderation. A follow-up lipid panel will be ordered to be completed in 6 months.    3. Elevated liver function tests.  Two of his three liver function tests are slightly elevated, but overall liver function has significantly improved over the last 9 months. A follow-up liver function test will be ordered to be completed in 6 months.    4. Thyroid antibodies.  He has tested positive for thyroid peroxidase (TPO) antibodies, suggesting a possible autoimmune condition. However,  his thyroid hormone levels are within normal range. This will be further discussed with his endocrinologist during his upcoming appointment in March 2025.    5. Swelling in the groin area.  He reports occasional swelling in the groin area, which is not painful but causes discomfort.  Physical exam revealed no signs of redness, bruising, numbness, or tingling, nor is he currently having any symptoms at this time. He is advised to monitor for any changes in skin color, tightness, or other symptoms and to log these observations. If the swelling persists or worsens, he should return for an ultrasound examination.    Return in about 6 months (around 7/29/2025), or if symptoms worsen or fail to improve, for Labs.       Please note that this note was created using dictation with voice recognition software. I have made every reasonable attempt to correct obvious errors, but I expect that there are errors of grammar and possibly content that I did not discover before finalizing the note.    NATHALIE Metcalf  [facility]  [facility]

## 2025-02-26 ENCOUNTER — HOSPITAL ENCOUNTER (OUTPATIENT)
Dept: LAB | Facility: MEDICAL CENTER | Age: 45
End: 2025-02-26
Attending: INTERNAL MEDICINE
Payer: COMMERCIAL

## 2025-02-26 PROCEDURE — 84403 ASSAY OF TOTAL TESTOSTERONE: CPT

## 2025-02-26 PROCEDURE — 84270 ASSAY OF SEX HORMONE GLOBUL: CPT

## 2025-02-26 PROCEDURE — 36415 COLL VENOUS BLD VENIPUNCTURE: CPT

## 2025-02-27 LAB — TESTOST SERPL-MCNC: 623 NG/DL (ref 175–781)

## 2025-02-28 LAB — SHBG SERPL-SCNC: 54 NMOL/L (ref 17–56)

## 2025-04-13 ENCOUNTER — HOSPITAL ENCOUNTER (OUTPATIENT)
Dept: RADIOLOGY | Facility: MEDICAL CENTER | Age: 45
End: 2025-04-13
Payer: COMMERCIAL

## 2025-04-13 ENCOUNTER — RESULTS FOLLOW-UP (OUTPATIENT)
Dept: URGENT CARE | Facility: CLINIC | Age: 45
End: 2025-04-13

## 2025-04-13 ENCOUNTER — OFFICE VISIT (OUTPATIENT)
Dept: URGENT CARE | Facility: CLINIC | Age: 45
End: 2025-04-13
Payer: COMMERCIAL

## 2025-04-13 VITALS
TEMPERATURE: 98.1 F | DIASTOLIC BLOOD PRESSURE: 70 MMHG | HEART RATE: 99 BPM | SYSTOLIC BLOOD PRESSURE: 130 MMHG | RESPIRATION RATE: 14 BRPM | HEIGHT: 68 IN | OXYGEN SATURATION: 92 % | WEIGHT: 245 LBS | BODY MASS INDEX: 37.13 KG/M2

## 2025-04-13 DIAGNOSIS — S83.91XA SPRAIN OF RIGHT KNEE, UNSPECIFIED LIGAMENT, INITIAL ENCOUNTER: ICD-10-CM

## 2025-04-13 DIAGNOSIS — S89.91XA INJURY OF RIGHT KNEE, INITIAL ENCOUNTER: ICD-10-CM

## 2025-04-13 PROCEDURE — 3078F DIAST BP <80 MM HG: CPT

## 2025-04-13 PROCEDURE — 99214 OFFICE O/P EST MOD 30 MIN: CPT

## 2025-04-13 PROCEDURE — 3075F SYST BP GE 130 - 139MM HG: CPT

## 2025-04-13 PROCEDURE — 73564 X-RAY EXAM KNEE 4 OR MORE: CPT | Mod: RT

## 2025-04-13 RX ORDER — TESTOSTERONE CYPIONATE 200 MG/ML
INJECTION, SOLUTION INTRAMUSCULAR
COMMUNITY
Start: 2025-03-11

## 2025-04-13 ASSESSMENT — FIBROSIS 4 INDEX: FIB4 SCORE: 0.97

## 2025-04-13 NOTE — PROGRESS NOTES
Subjective:   Josiah Strong is a 44 y.o. male who presents for Knee Injury (Sx started 1 day ago, Stood up and knee started hurting, has had bump on knee before. )    Patient presents to the clinic for complaints of right knee pain x 1 day.  Patient was on his knees to get something from the floor and when he got up, he felt a sharp pain in the middle of his right knee. 10/10 severity at that time.   At this time, 3/10 pain, sharp pain. Pain illicited with knee extension.  He can still walk, but pain with weight bearing on the left knee.   Felt some tingling down his leg while showering this morning, but only for a bit and has resolved.   Patient denies chest pain, SOB, dizziness/lightheadedness/vertigo, nausea/vomiting/diarrhea, difficulty breathing or swallowing, palpitations or racing heart rate, inability to walk or bear weight, loss of range of motion, swelling, bruising or dark discoloration, creaking with movement, or numbness and tingling.      Knee Injury        ROS  Refer to [facility] for additional details.    During this visit, appropriate PPE was worn, and hand hygiene was performed.    PMH:  has a past medical history of Anesthesia, Finger pain, left (04/17/2024), Ganglion of hand, left (05/08/2024), Lump on finger, left (04/17/2024), Sleep apnea, and Snoring.    MEDS:   Current Outpatient Medications:     testosterone cypionate (DEPO-TESTOSTERONE) 200 MG/ML injection, 100 MG ( 0.5 ML) INTRAMUSCULAR EVERY 7 DAYS 84 DAYS, Disp: , Rfl:     ergocalciferol (DRISDOL) 00346 UNIT capsule, Take 1 Capsule by mouth every 7 days., Disp: 12 Capsule, Rfl: 3    ALLERGIES: No Known Allergies  SURGHX:   Past Surgical History:   Procedure Laterality Date    PB EXC RAMON/VAS MAL SFT TIS HAND/FNGR SUBFASC* Left 6/11/2024    Procedure: LEFT RING AND MIDDLE FINGER MASS EXCISION;  Surgeon: Roque Mccann M.D.;  Location: [facility] [place];  Service: Orthopedics    INGUINAL HERNIA REPAIR ROBOTIC  "Bilateral 4/24/2018    Procedure: INGUINAL HERNIA REPAIR ROBOTIC;  Surgeon: Marc Ojeda M.D.;  Location: [facility];  Service: General    OTHER  03/06/2018    eye; dettached retina     SOCHX:  reports that he has been smoking cigarettes. He has a 20 pack-year smoking history. He has never used smokeless tobacco. He reports current alcohol use. He reports that he does not use drugs.    FH: Per HPI as applicable/pertinent.    Medications, Allergies, and current problem list reviewed today in Epic.     Objective:     /70   Pulse 99   Temp 36.7 °C (98.1 °F) (Temporal)   Resp 14   Ht 1.727 m (5' 8\")   Wt 111 kg (245 lb)   SpO2 92%     Physical Exam  Constitutional:       Appearance: Normal appearance. He is not ill-appearing or toxic-appearing.   HENT:      Head: Normocephalic.      Right Ear: External ear normal.      Left Ear: External ear normal.      Nose: Nose normal. No congestion or rhinorrhea.      Mouth/Throat:      Mouth: Mucous membranes are moist.   Eyes:      General:         Right eye: No discharge.         Left eye: No discharge.      Extraocular Movements: Extraocular movements intact.      Conjunctiva/sclera: Conjunctivae normal.      Pupils: Pupils are equal, round, and reactive to light.   Cardiovascular:      Rate and Rhythm: Normal rate and regular rhythm.      Pulses: Normal pulses.      Heart sounds: Normal heart sounds. No murmur heard.  Pulmonary:      Effort: Pulmonary effort is normal. No respiratory distress.      Breath sounds: Normal breath sounds. No wheezing or rhonchi.   Abdominal:      General: Abdomen is flat.   Musculoskeletal:         General: Swelling, tenderness and signs of injury present. No deformity. Normal range of motion.      Cervical back: Normal range of motion. No rigidity.      Right knee: Swelling present. No deformity, effusion, erythema, ecchymosis, lacerations or crepitus. Normal range of motion. Tenderness present over the medial joint " line. Normal alignment and normal meniscus. Normal pulse.      Left knee: Normal.      Right lower leg: No edema.      Left lower leg: No edema.        Legs:    Lymphadenopathy:      Cervical: No cervical adenopathy.   Skin:     General: Skin is warm and dry.      Capillary Refill: Capillary refill takes less than 2 seconds.   Neurological:      General: No focal deficit present.      Mental Status: He is alert and oriented to person, place, and time.      Motor: No weakness.      Gait: Gait abnormal (limping d/t right knee pain when walking).         Assessment/Plan:     Diagnosis and associated orders:     1. Injury of right knee, initial encounter  - DX-KNEE COMPLETE 4+ RIGHT; Future    Other orders  - testosterone cypionate (DEPO-TESTOSTERONE) 200 MG/ML injection; 100 MG ( 0.5 ML) INTRAMUSCULAR EVERY 7 DAYS 84 DAYS     Comments/MDM:     Discussed that likely etiology of the patient's symptoms is right knee injury.  X-ray of the right knee ordered for the patient for further imaging of the joint and check for fractures, dislocations, or any osseous abnormalities.  X-ray unfortunately not available in the urgent care today.  Per patient, he will go to another [facility] to complete the x-rays. Discussed that they will receive a phone call or MyChart message from this provider regarding the results of the tests along with any changes with medication or treatment plan as appropriate.  Instructed patient to eliminate weightbearing to the right lower extremity until x-rays are received and resulted.  Crutches provided to the patient in urgent care prior to discharge.  Patient agreeable to this plan of care.  All questions answered.  Return to clinic if symptoms persist or worsen.  Red flag symptoms warranting emergency medical services discussed, including but not limited to chest pain, SOB, wheezing, difficulty breathing or swallowing, sensation of throat closing or mass in the throat, severe  intractable headache, changes to vision or hearing, palpitations or racing heart rate, abdominal pain, fever greater than 103F despite medication management, inability to walk or bear weight, numbness/tingling or loss of sensation, dizziness/lightheadedness/vertigo, or nausea/vomiting/diarrhea.           Differential diagnosis, natural history, supportive care, and indications for immediate follow-up discussed.    Advised the patient to follow-up with the primary care physician for recheck, reevaluation, and consideration of further management.    Instructed patient to seek emergency medical attention via calling EMS or going to the Emergency Room for red flag symptoms, including but not limited to: chest pain, palpitations, fever greater than 103F, shortness of breath, wheezing, new or worsened numbness/tingling, focal or unilateral weakness, and bloody vomit/stool.     Please note that this dictation was created using voice recognition software. I have made a reasonable attempt to correct obvious errors, but I expect that there are errors of grammar and possibly content that I did not discover before finalizing the note.    This note was electronically signed by RAMIRO Colmenares

## 2025-04-13 NOTE — RESULT ENCOUNTER NOTE
Andrew Borges,  I just reviewed the results of your knee x-ray, which came back negative for any fractures or dislocations.  So likely, it is a soft tissue injury involving tendons and ligaments.  I will send a referral to a specialist, Dr. Parish with [facility] med for further care and management of your likely right knee sprain/strain.  They usually reach out via phone call in 5-7 business days to help you schedule your appointment.  Until then, limit weightbearing on that right knee and utilize Tylenol, Motrin, and RICE therapy.  If you have any further questions or concerns, please reach out.  Otherwise, take care and have a great day.    NATHALIE Colmenares.

## 2025-04-17 NOTE — Clinical Note
REFERRAL APPROVAL NOTICE         Sent on April 17, 2025                   Jony Strong  [address] [facility] [address]                   Dear Mr. Jos Strong,    After a careful review of the medical information and benefit coverage, [facility] has processed your referral. See below for additional details.    If applicable, you must be actively enrolled with your insurance for coverage of the authorized service. If you have any questions regarding your coverage, please contact your insurance directly.    REFERRAL INFORMATION   Referral #:  [id number]  Referred-To Department    Referred-By Provider:  Sports Medicine    RAMIRO Colmenares   [facility]      [address]  [address]  [facility] [id number] [phone] [address] [facility] [id number] [phone]    Referral Start Date:  04/13/2025  Referral End Date:   04/13/2026             SCHEDULING  If you do not already have an appointment, please call [phone] to make an appointment.     MORE INFORMATION  If you do not already have a [employer] account, sign up at: [email or web address]  You can access your medical information, make appointments, see lab results, billing information, and more.  If you have questions regarding this referral, please contact  the [facility] Referrals department at:             [phone]. Monday - Friday 8:00AM - 5:00PM.     Sincerely,    [facility]

## 2025-04-20 SDOH — ECONOMIC STABILITY: TRANSPORTATION INSECURITY
IN THE PAST 12 MONTHS, HAS LACK OF RELIABLE TRANSPORTATION KEPT YOU FROM MEDICAL APPOINTMENTS, MEETINGS, WORK OR FROM GETTING THINGS NEEDED FOR DAILY LIVING?: NO

## 2025-04-20 SDOH — HEALTH STABILITY: PHYSICAL HEALTH: ON AVERAGE, HOW MANY DAYS PER WEEK DO YOU ENGAGE IN MODERATE TO STRENUOUS EXERCISE (LIKE A BRISK WALK)?: 5 DAYS

## 2025-04-20 SDOH — ECONOMIC STABILITY: INCOME INSECURITY: IN THE LAST 12 MONTHS, WAS THERE A TIME WHEN YOU WERE NOT ABLE TO PAY THE MORTGAGE OR RENT ON TIME?: NO

## 2025-04-20 SDOH — ECONOMIC STABILITY: INCOME INSECURITY: HOW HARD IS IT FOR YOU TO PAY FOR THE VERY BASICS LIKE FOOD, HOUSING, MEDICAL CARE, AND HEATING?: NOT VERY HARD

## 2025-04-20 SDOH — HEALTH STABILITY: MENTAL HEALTH
STRESS IS WHEN SOMEONE FEELS TENSE, NERVOUS, ANXIOUS, OR CAN'T SLEEP AT NIGHT BECAUSE THEIR MIND IS TROUBLED. HOW STRESSED ARE YOU?: ONLY A LITTLE

## 2025-04-20 SDOH — HEALTH STABILITY: PHYSICAL HEALTH: ON AVERAGE, HOW MANY MINUTES DO YOU ENGAGE IN EXERCISE AT THIS LEVEL?: 60 MIN

## 2025-04-20 SDOH — ECONOMIC STABILITY: FOOD INSECURITY: WITHIN THE PAST 12 MONTHS, THE FOOD YOU BOUGHT JUST DIDN'T LAST AND YOU DIDN'T HAVE MONEY TO GET MORE.: SOMETIMES TRUE

## 2025-04-20 SDOH — ECONOMIC STABILITY: TRANSPORTATION INSECURITY
IN THE PAST 12 MONTHS, HAS LACK OF TRANSPORTATION KEPT YOU FROM MEETINGS, WORK, OR FROM GETTING THINGS NEEDED FOR DAILY LIVING?: NO

## 2025-04-20 SDOH — ECONOMIC STABILITY: FOOD INSECURITY: WITHIN THE PAST 12 MONTHS, YOU WORRIED THAT YOUR FOOD WOULD RUN OUT BEFORE YOU GOT MONEY TO BUY MORE.: NEVER TRUE

## 2025-04-20 SDOH — ECONOMIC STABILITY: TRANSPORTATION INSECURITY
IN THE PAST 12 MONTHS, HAS THE LACK OF TRANSPORTATION KEPT YOU FROM MEDICAL APPOINTMENTS OR FROM GETTING MEDICATIONS?: NO

## 2025-04-20 SDOH — ECONOMIC STABILITY: HOUSING INSECURITY
IN THE LAST 12 MONTHS, WAS THERE A TIME WHEN YOU DID NOT HAVE A STEADY PLACE TO SLEEP OR SLEPT IN A SHELTER (INCLUDING NOW)?: NO

## 2025-04-20 ASSESSMENT — SOCIAL DETERMINANTS OF HEALTH (SDOH)
DO YOU BELONG TO ANY CLUBS OR ORGANIZATIONS SUCH AS CHURCH GROUPS UNIONS, FRATERNAL OR ATHLETIC GROUPS, OR SCHOOL GROUPS?: PATIENT DECLINED
IN A TYPICAL WEEK, HOW MANY TIMES DO YOU TALK ON THE PHONE WITH FAMILY, FRIENDS, OR NEIGHBORS?: ONCE A WEEK
DO YOU BELONG TO ANY CLUBS OR ORGANIZATIONS SUCH AS CHURCH GROUPS UNIONS, FRATERNAL OR ATHLETIC GROUPS, OR SCHOOL GROUPS?: PATIENT DECLINED
HOW OFTEN DO YOU GET TOGETHER WITH FRIENDS OR RELATIVES?: TWICE A WEEK
HOW OFTEN DO YOU ATTEND CHURCH OR RELIGIOUS SERVICES?: NEVER
IN THE PAST 12 MONTHS, HAS THE ELECTRIC, GAS, OIL, OR WATER COMPANY THREATENED TO SHUT OFF SERVICE IN YOUR HOME?: NO
HOW OFTEN DO YOU HAVE A DRINK CONTAINING ALCOHOL: 2-3 TIMES A WEEK
HOW OFTEN DO YOU ATTENT MEETINGS OF THE CLUB OR ORGANIZATION YOU BELONG TO?: PATIENT DECLINED
IN A TYPICAL WEEK, HOW MANY TIMES DO YOU TALK ON THE PHONE WITH FAMILY, FRIENDS, OR NEIGHBORS?: ONCE A WEEK
HOW MANY DRINKS CONTAINING ALCOHOL DO YOU HAVE ON A TYPICAL DAY WHEN YOU ARE DRINKING: 7 TO 9
HOW OFTEN DO YOU ATTENT MEETINGS OF THE CLUB OR ORGANIZATION YOU BELONG TO?: PATIENT DECLINED
HOW OFTEN DO YOU HAVE SIX OR MORE DRINKS ON ONE OCCASION: WEEKLY
WITHIN THE PAST 12 MONTHS, YOU WORRIED THAT YOUR FOOD WOULD RUN OUT BEFORE YOU GOT THE MONEY TO BUY MORE: NEVER TRUE
HOW OFTEN DO YOU GET TOGETHER WITH FRIENDS OR RELATIVES?: TWICE A WEEK
HOW OFTEN DO YOU ATTEND CHURCH OR RELIGIOUS SERVICES?: NEVER
HOW HARD IS IT FOR YOU TO PAY FOR THE VERY BASICS LIKE FOOD, HOUSING, MEDICAL CARE, AND HEATING?: NOT VERY HARD

## 2025-04-20 ASSESSMENT — LIFESTYLE VARIABLES
HOW MANY STANDARD DRINKS CONTAINING ALCOHOL DO YOU HAVE ON A TYPICAL DAY: 7 TO 9
HOW OFTEN DO YOU HAVE A DRINK CONTAINING ALCOHOL: 2-3 TIMES A WEEK
SKIP TO QUESTIONS 9-10: 0
HOW OFTEN DO YOU HAVE SIX OR MORE DRINKS ON ONE OCCASION: WEEKLY
AUDIT-C TOTAL SCORE: 9

## 2025-04-21 ENCOUNTER — APPOINTMENT (OUTPATIENT)
Dept: SPORTS MEDICINE | Facility: OTHER | Age: 45
End: 2025-04-21
Payer: COMMERCIAL

## 2025-04-29 ENCOUNTER — OFFICE VISIT (OUTPATIENT)
Dept: SPORTS MEDICINE | Facility: OTHER | Age: 45
End: 2025-04-29
Payer: COMMERCIAL

## 2025-04-29 VITALS
RESPIRATION RATE: 18 BRPM | HEIGHT: 68 IN | OXYGEN SATURATION: 96 % | TEMPERATURE: 98.3 F | HEART RATE: 83 BPM | WEIGHT: 245 LBS | SYSTOLIC BLOOD PRESSURE: 122 MMHG | DIASTOLIC BLOOD PRESSURE: 78 MMHG | BODY MASS INDEX: 37.13 KG/M2

## 2025-04-29 DIAGNOSIS — M25.561 PATELLAR PAIN, RIGHT: ICD-10-CM

## 2025-04-29 ASSESSMENT — FIBROSIS 4 INDEX: FIB4 SCORE: 0.97

## 2025-04-29 NOTE — PROGRESS NOTES
Chief Complaint   Patient presents with    Knee Pain     R knee pain      CHIEF COMPLAINT:  Josiah Strong male presenting at the request of NATHALIE Colmenares  for evaluation of knee pain.     Josiah Strong is complaining of right knee pain  present for 2 weeks  About 2 months ago he knelt down onto a rock and had sudden sharp pain in the RIGHT anterior knee  Subsequently, he did feel a soft tissue mass with this was similar to a cystic structure as he describes it  Pain is at the anterior knee  Quality is sharp  Pain is non-radiating, but affected anterior knee in general when flareed  Improved with avoiding pressure to the anterior knee  Aggravated by applying pressure to the anterior knee  no prior problems with this area in the past   Prior Treatments:  Seen at urgent care  Prior studies: X-Ray   Medications tried for pain include: none  Mechanical Symptom history: No Locking    Construction/remodeling and maintenance  Activities include walking and riding motorcycles    REVIEW OF SYSTEMS  No Nausea, No Vomiting, No Chest Pain, No Shortness of Breath, No Dizziness, No Headache      PAST MEDICAL HISTORY:   History reviewed. No pertinent past medical history.    PMH:  has a past medical history of Anesthesia, Finger pain, left (04/17/2024), Ganglion of hand, left (05/08/2024), Lump on finger, left (04/17/2024), Sleep apnea, and Snoring.  MEDS:   Current Outpatient Medications:     testosterone cypionate (DEPO-TESTOSTERONE) 200 MG/ML injection, 100 MG ( 0.5 ML) INTRAMUSCULAR EVERY 7 DAYS 84 DAYS, Disp: , Rfl:     ergocalciferol (DRISDOL) 82714 UNIT capsule, Take 1 Capsule by mouth every 7 days., Disp: 12 Capsule, Rfl: 3  ALLERGIES: No Known Allergies  SURGHX:   Past Surgical History:   Procedure Laterality Date    PB EXC RAMON/VAS MAL SFT TIS HAND/FNGR SUBFASC* Left 6/11/2024    Procedure: LEFT RING AND MIDDLE FINGER MASS EXCISION;  Surgeon: Roque Mccann M.D.;  [facility];  Service: Orthopedics    INGUINAL HERNIA REPAIR ROBOTIC Bilateral 4/24/2018    Procedure: INGUINAL HERNIA REPAIR ROBOTIC;  Surgeon: Marc Ojeda M.D.;  Location: [facility];  Service: General    OTHER  03/06/2018    eye; dettached retina     SOCHX:  reports that he has been smoking cigarettes. He has a 20 pack-year smoking history. He has never used smokeless tobacco. He reports current alcohol use. He reports that he does not use drugs.  FH: Family history was reviewed, no pertinent findings to report     PHYSICAL EXAM:  /78 (BP Location: Left arm, Patient Position: Sitting, BP Cuff Size: Adult)   Pulse 83   Temp 36.8 °C (98.3 °F) (Temporal)   Resp 18   Ht 1.727 m (5' 8\")   Wt 111 kg (245 lb)   SpO2 96%   BMI 37.25 kg/m²      well-developed, well-nourished in no apparent distress, alert and oriented x 3.  Gait: normal     RIGHT Knee:  Slight Varus and No Swelling  Range of Motion Intact  Trace effusion  Patellar No tenderness and no apprehension  Medial Joint Line Non-tender and NEGATIVE Reyna  Lateral Joint Line Non-tender and NEGATIVE Reyna  Trace Laxity with Varus stress  Trace Laxity with Valgus stress  Lachman's testing is Trace  Posterior Drawer Testing is Trace  The leg is otherwise neurovascularly intact    LEFT Knee:  Slight Varus and No Swelling   Range of Motion Intact  Trace effusion  Patellar No tenderness and no apprehension  Medial Joint Line Non-tender and NEGATIVE Reyna  Lateral Joint Line Non-tender and NEGATIVE Reyna  Trace Laxity with Varus stress  Trace Laxity with Valgus stress  Lachman's testing is Trace  Posterior Drawer Testing is Trace  The leg is otherwise neurovascularly intact    Additional Findings: None    1. Patellar pain, right (SCAR TISSUE patellar branch of the saphenous nerve)          present for 2 weeks  About 2 months ago he knelt down onto a rock and had sudden sharp pain in the RIGHT anterior knee    Suspect " scar tissue in the patellar trajectory of the saphenous nerve due to direct trauma from kneeling on a small stone    Fortunately, he is better, but if symptoms persist, would consider corticosteroid injection in the region of his scar tissue/swelling    Follow-up as needed            4/13/2025 12:22 PM     HISTORY/REASON FOR EXAM:  Pain/Deformity Following Trauma.        TECHNIQUE/EXAM DESCRIPTION AND NUMBER OF VIEWS:  4 views of the RIGHT knee.     COMPARISON: None     FINDINGS:  There is no fracture or dislocation.  The visualized osseous structures are in anatomic alignment.  Joint spaces are preserved.  Bone mineralization is age-appropriate..  No significant joint effusion.     IMPRESSION:     No acute osseous abnormality.        Exam Ended: 04/13/25 12:30 PM Last Resulted: 04/13/25 12:39 PM     Interpreted in the office today with the patient    Thank you NATHALIE Colmenares for allowing me to participate in care of your patient.

## 2025-06-18 ENCOUNTER — HOSPITAL ENCOUNTER (OUTPATIENT)
Dept: LAB | Facility: MEDICAL CENTER | Age: 45
End: 2025-06-18
Attending: INTERNAL MEDICINE
Payer: COMMERCIAL

## 2025-06-18 LAB
25(OH)D3 SERPL-MCNC: 55 NG/ML (ref 30–100)
ERYTHROCYTE [DISTWIDTH] IN BLOOD BY AUTOMATED COUNT: 46.5 FL (ref 35.9–50)
ESTRADIOL SERPL-MCNC: 21.4 PG/ML
FSH SERPL-ACNC: <0.3 MIU/ML (ref 1.5–12.4)
HCT VFR BLD AUTO: 56.4 % (ref 42–52)
HGB BLD-MCNC: 18.8 G/DL (ref 14–18)
LH SERPL-ACNC: <0.3 IU/L (ref 1.7–8.6)
MCH RBC QN AUTO: 31.4 PG (ref 27–33)
MCHC RBC AUTO-ENTMCNC: 33.3 G/DL (ref 32.3–36.5)
MCV RBC AUTO: 94.3 FL (ref 81.4–97.8)
PLATELET # BLD AUTO: 204 K/UL (ref 164–446)
PMV BLD AUTO: 12.4 FL (ref 9–12.9)
PROLACTIN SERPL-MCNC: 5.66 NG/ML (ref 2.1–17.7)
RBC # BLD AUTO: 5.98 M/UL (ref 4.7–6.1)
T3FREE SERPL-MCNC: 3.91 PG/ML (ref 2–4.4)
T4 FREE SERPL-MCNC: 1.36 NG/DL (ref 0.93–1.7)
TESTOST SERPL-MCNC: 870 NG/DL (ref 175–781)
THYROPEROXIDASE AB SERPL-ACNC: 12.4 IU/ML (ref 0–9)
TSH SERPL-ACNC: 1.09 UIU/ML (ref 0.38–5.33)
WBC # BLD AUTO: 8.7 K/UL (ref 4.8–10.8)

## 2025-06-18 PROCEDURE — 84146 ASSAY OF PROLACTIN: CPT

## 2025-06-18 PROCEDURE — 84439 ASSAY OF FREE THYROXINE: CPT

## 2025-06-18 PROCEDURE — 82670 ASSAY OF TOTAL ESTRADIOL: CPT

## 2025-06-18 PROCEDURE — 83001 ASSAY OF GONADOTROPIN (FSH): CPT

## 2025-06-18 PROCEDURE — 82306 VITAMIN D 25 HYDROXY: CPT

## 2025-06-18 PROCEDURE — 85027 COMPLETE CBC AUTOMATED: CPT

## 2025-06-18 PROCEDURE — 86376 MICROSOMAL ANTIBODY EACH: CPT

## 2025-06-18 PROCEDURE — 84270 ASSAY OF SEX HORMONE GLOBUL: CPT

## 2025-06-18 PROCEDURE — 83002 ASSAY OF GONADOTROPIN (LH): CPT

## 2025-06-18 PROCEDURE — 84443 ASSAY THYROID STIM HORMONE: CPT

## 2025-06-18 PROCEDURE — 84481 FREE ASSAY (FT-3): CPT

## 2025-06-18 PROCEDURE — 36415 COLL VENOUS BLD VENIPUNCTURE: CPT

## 2025-06-18 PROCEDURE — 84403 ASSAY OF TOTAL TESTOSTERONE: CPT

## 2025-06-21 LAB — SHBG SERPL-SCNC: 51 NMOL/L (ref 17–56)

## (undated) DEVICE — GLOVE BIOGEL SZ 7 SURGICAL PF LTX - (50PR/BX 4BX/CA)

## (undated) DEVICE — SET LEADWIRE 5 LEAD BEDSIDE DISPOSABLE ECG (1SET OF 5/EA)

## (undated) DEVICE — GLOVE BIOGEL SZ 6.5 SURGICAL PF LTX (50PR/BX 4BX/CA)

## (undated) DEVICE — SUTURE 0 COATED VICRYL 6-18IN - (12PK/BX)

## (undated) DEVICE — GLOVE BIOGEL SZ 7.5 SURGICAL PF LTX - (50PR/BX 4BX/CA)

## (undated) DEVICE — ROBOTIC SURGERY SERVICES

## (undated) DEVICE — SUTURE 2-0 VICRYL PLUS CT-2 - 27 INCH (36/BX)

## (undated) DEVICE — TUBING INSUFFLATION - (10/BX)

## (undated) DEVICE — GOWN SURGEONS X-LARGE - DISP. (30/CA)

## (undated) DEVICE — ELECTRODE 850 FOAM ADHESIVE - HYDROGEL RADIOTRNSPRNT (50/PK)

## (undated) DEVICE — SODIUM CHL IRRIGATION 0.9% 1000ML (12EA/CA)

## (undated) DEVICE — CANISTER SUCTION 3000ML MECHANICAL FILTER AUTO SHUTOFF MEDI-VAC NONSTERILE LF DISP  (40EA/CA)

## (undated) DEVICE — CHLORAPREP 26 ML APPLICATOR - ORANGE TINT(25/CA)

## (undated) DEVICE — DRESSING TRANSPARENT FILM TEGADERM 2.375 X 2.75"  (100EA/BX)"

## (undated) DEVICE — TUBE NG SALEM SUMP 16FR (50EA/CA)

## (undated) DEVICE — BLADE SURGICAL CLIPPER - (50EA/CA)

## (undated) DEVICE — NEPTUNE 4 PORT MANIFOLD - (20/PK)

## (undated) DEVICE — TUBE E-T HI-LO CUFF 8.0MM (10EA/PK)

## (undated) DEVICE — WATER IRRIG. STER. 1500 ML - (9/CA)

## (undated) DEVICE — SPONGE GAUZESTER. 2X2 4-PL - (2/PK 50PK/BX 30BX/CS)

## (undated) DEVICE — DRAPE 3 ARM DA VANCI SI - (5EA/CA)

## (undated) DEVICE — HEAD HOLDER JUNIOR/ADULT

## (undated) DEVICE — SENSOR SPO2 NEO LNCS ADHESIVE (20/BX) SEE USER NOTES

## (undated) DEVICE — BANDAID SHEER STRIP 3/4 IN (100EA/BX 12BX/CA)

## (undated) DEVICE — TUBING CLEARLINK DUO-VENT - C-FLO (48EA/CA)

## (undated) DEVICE — SUTURE 2-0 20CM STRATAFIX SPIRAL SH NEEDLE (12/BX)

## (undated) DEVICE — KIT ROOM DECONTAMINATION

## (undated) DEVICE — KIT ANESTHESIA W/CIRCUIT & 3/LT BAG W/FILTER (20EA/CA)

## (undated) DEVICE — GLOVE BIOGEL INDICATOR SZ 7.5 SURGICAL PF LTX - (50PR/BX 4BX/CA)

## (undated) DEVICE — ELECTRODE DUAL RETURN W/ CORD - (50/PK)

## (undated) DEVICE — SYRINGE DISP. 12 CC LL - (100/BX)

## (undated) DEVICE — SLEEVE, VASO, THIGH, MED

## (undated) DEVICE — SYSTEM CLEARIFY VISUALIZATION (10EA/PK)

## (undated) DEVICE — GOWN WARMING STANDARD FLEX - (30/CA)

## (undated) DEVICE — OBTURATOR 8MM BLADELESS - (24EA/BX) DA VINCI

## (undated) DEVICE — SUCTION INSTRUMENT YANKAUER BULBOUS TIP W/O VENT (50EA/CA)

## (undated) DEVICE — MASK ANESTHESIA ADULT  - (100/CA)

## (undated) DEVICE — GLOVE BIOGEL PI INDICATOR SZ 6.5 SURGICAL PF LF - (50/BX 4BX/CA)

## (undated) DEVICE — Device

## (undated) DEVICE — SET SUCTION/IRRIGATION WITH DISPOSABLE TIP (6/CA )PART #0250-070-520 IS A SUB

## (undated) DEVICE — TROCAR 12 X 150 KII FIOS Z THREAD (6EA/BX)

## (undated) DEVICE — SUTURE GENERAL

## (undated) DEVICE — COVER TIP ENDOWRIST HOT SHEAR - (10EA/BX) DA VINCI

## (undated) DEVICE — STERI STRIP COMPOUND BENZOIN - TINCTURE 0.6ML WITH APPLICATOR (40EA/BX)

## (undated) DEVICE — BANDAID X-LARGE 2 X 4 IN LF (50EA/BX)

## (undated) DEVICE — PROTECTOR ULNA NERVE - (36PR/CA)

## (undated) DEVICE — TUBE CONNECT SUCTION CLEAR 120 X 1/4" (50EA/CA)"

## (undated) DEVICE — SUTURE 4-0 MONOCRYL PLUS PS-2 - 27 INCH (36/BX)

## (undated) DEVICE — CLOSURE SKIN STRIP 1/2 X 4 IN - (STERI STRIP) (50/BX 4BX/CA)

## (undated) DEVICE — SET EXTENSION WITH 2 PORTS (48EA/CA) ***PART #2C8610 IS A SUBSTITUTE*****

## (undated) DEVICE — NEEDLE INSFL 120MM 14GA VRRS - (20/BX)

## (undated) DEVICE — LACTATED RINGERS INJ 1000 ML - (14EA/CA 60CA/PF)